# Patient Record
Sex: FEMALE | Employment: FULL TIME | URBAN - METROPOLITAN AREA
[De-identification: names, ages, dates, MRNs, and addresses within clinical notes are randomized per-mention and may not be internally consistent; named-entity substitution may affect disease eponyms.]

---

## 2017-06-25 ENCOUNTER — HOSPITAL ENCOUNTER (EMERGENCY)
Age: 27
Discharge: HOME | End: 2017-06-25
Attending: EMERGENCY MEDICINE
Payer: MEDICAID

## 2017-06-25 VITALS
HEART RATE: 104 BPM | HEIGHT: 61 IN | SYSTOLIC BLOOD PRESSURE: 170 MMHG | DIASTOLIC BLOOD PRESSURE: 114 MMHG | WEIGHT: 220 LBS | RESPIRATION RATE: 18 BRPM | TEMPERATURE: 98.6 F | BODY MASS INDEX: 41.54 KG/M2 | OXYGEN SATURATION: 97 %

## 2017-06-25 DIAGNOSIS — K02.9 DENTAL DECAY: Primary | ICD-10-CM

## 2017-06-25 DIAGNOSIS — K08.89 DENTALGIA: ICD-10-CM

## 2017-06-25 DIAGNOSIS — I10 ESSENTIAL HYPERTENSION: ICD-10-CM

## 2017-06-25 DIAGNOSIS — K04.7 DENTAL INFECTION: ICD-10-CM

## 2017-06-25 PROCEDURE — 74011250636 HC RX REV CODE- 250/636: Performed by: PHYSICIAN ASSISTANT

## 2017-06-25 PROCEDURE — 96372 THER/PROPH/DIAG INJ SC/IM: CPT

## 2017-06-25 PROCEDURE — 99282 EMERGENCY DEPT VISIT SF MDM: CPT

## 2017-06-25 PROCEDURE — 74011000250 HC RX REV CODE- 250: Performed by: PHYSICIAN ASSISTANT

## 2017-06-25 RX ORDER — ACETAMINOPHEN AND CODEINE PHOSPHATE 300; 30 MG/1; MG/1
1 TABLET ORAL
Qty: 12 TAB | Refills: 0 | Status: SHIPPED | OUTPATIENT
Start: 2017-06-25 | End: 2017-08-20

## 2017-06-25 RX ORDER — CLINDAMYCIN PHOSPHATE 150 MG/ML
300 INJECTION, SOLUTION INTRAVENOUS
Status: COMPLETED | OUTPATIENT
Start: 2017-06-25 | End: 2017-06-25

## 2017-06-25 RX ORDER — CLINDAMYCIN HYDROCHLORIDE 300 MG/1
300 CAPSULE ORAL 4 TIMES DAILY
Qty: 28 CAP | Refills: 0 | Status: SHIPPED | OUTPATIENT
Start: 2017-06-25 | End: 2017-07-02

## 2017-06-25 RX ORDER — CHLORHEXIDINE GLUCONATE 1.2 MG/ML
15 RINSE ORAL 2 TIMES DAILY
Qty: 420 ML | Refills: 0 | Status: SHIPPED | OUTPATIENT
Start: 2017-06-25 | End: 2017-07-09

## 2017-06-25 RX ORDER — KETOROLAC TROMETHAMINE 30 MG/ML
60 INJECTION, SOLUTION INTRAMUSCULAR; INTRAVENOUS
Status: COMPLETED | OUTPATIENT
Start: 2017-06-25 | End: 2017-06-25

## 2017-06-25 RX ADMIN — KETOROLAC TROMETHAMINE 60 MG: 30 INJECTION, SOLUTION INTRAMUSCULAR at 16:59

## 2017-06-25 RX ADMIN — CLINDAMYCIN PHOSPHATE 300 MG: 150 INJECTION, SOLUTION INTRAMUSCULAR; INTRAVENOUS at 16:59

## 2017-06-25 NOTE — ED NOTES
Patient armband removed and shredded  I have reviewed discharge instructions with the patient. The patient verbalized understanding. Patient discharged on three prescritpions.

## 2017-06-25 NOTE — ED PROVIDER NOTES
HPI Comments: 4:10 PM Kushal Tian is a 28 y.o. female who presents to the ED c/o R upper dental pain. Pt states that the pain started 1 day ago. Pt has assocaited R sided facial swelling. Pt has taken Ibuprofen w/ mild relief. Pt admits to not taking her HTN medication today. Pt denies drainage in the mouth, CP, SOB, HA. Pt has no other sx or complaints. Patient is a 28 y.o. female presenting with dental problem. The history is provided by the patient. Dental Pain    This is a new problem. The current episode started yesterday. The problem occurs constantly. The problem has been gradually worsening. The pain is located in the front and right upper mouth. The pain is at a severity of 10/10. Associated symptoms include swelling and gum redness. There was no vomiting, no nausea, no fever, no chest pain, no shortness of breath, no headaches and no drainage. Treatments tried: NSAIDs. The treatment provided mild relief. The patient has no cardiac history. Past Medical History:   Diagnosis Date    Dental caries     Gastrointestinal disorder     Hernia removed    Hypertension     Ill-defined condition        Past Surgical History:   Procedure Laterality Date    HX GYN      4 sections    HX OTHER SURGICAL      tonsils and addenoids removed         No family history on file. Social History     Social History    Marital status:      Spouse name: N/A    Number of children: N/A    Years of education: N/A     Occupational History    Not on file. Social History Main Topics    Smoking status: Current Every Day Smoker     Packs/day: 0.50    Smokeless tobacco: Not on file    Alcohol use No    Drug use: No    Sexual activity: Not on file     Other Topics Concern    Not on file     Social History Narrative         ALLERGIES: Penicillins    Review of Systems   Constitutional: Negative for chills and fever. HENT: Positive for dental problem and facial swelling.  Negative for congestion, ear pain, hearing loss, rhinorrhea, sore throat, trouble swallowing and voice change. Eyes: Negative for pain and redness. Respiratory: Negative for cough and shortness of breath. Cardiovascular: Negative for chest pain, palpitations and leg swelling. Gastrointestinal: Negative for abdominal pain, constipation, diarrhea, nausea and vomiting. Genitourinary: Negative for dysuria. Musculoskeletal: Negative for neck pain and neck stiffness. Skin: Negative. Neurological: Negative for dizziness, light-headedness and headaches. Psychiatric/Behavioral: Negative. Vitals:    06/25/17 1541   BP: (!) 170/114   Pulse: (!) 104   Resp: 18   Temp: 98.6 °F (37 °C)   SpO2: 97%   Weight: 99.8 kg (220 lb)   Height: 5' 1\" (1.549 m)            Physical Exam   Constitutional: She is oriented to person, place, and time. She appears well-developed and well-nourished. HENT:   Head: Normocephalic and atraumatic. Right Ear: Tympanic membrane, external ear and ear canal normal.   Left Ear: Tympanic membrane, external ear and ear canal normal.   Nose: Nose normal.   Mouth/Throat: Uvula is midline, oropharynx is clear and moist and mucous membranes are normal. Abnormal dentition. Dental caries present. No dental abscesses or uvula swelling. No oropharyngeal exudate, posterior oropharyngeal edema, posterior oropharyngeal erythema or tonsillar abscesses. Dental pain at # 2-9. There IS evidence of dental decay. There IS tenderness on palpation. The airway IS patent. Mild upper right mucobuccal soft tissue swelling, but no fluctuance or gingival bleeding. NO pus/drainage. NO swelling or elevation of the tongue. NO sublingual swelling or TTP. NO facial swelling. NO neck swelling. Eyes: Conjunctivae and EOM are normal. Pupils are equal, round, and reactive to light. Neck: Normal range of motion and full passive range of motion without pain. Neck supple.  No spinous process tenderness and no muscular tenderness present. No rigidity. No tracheal deviation, no edema, no erythema and normal range of motion present. No neck swelling or induration. Cardiovascular: Normal rate, regular rhythm, normal heart sounds and intact distal pulses. Exam reveals no gallop and no friction rub. No murmur heard. Pulmonary/Chest: Effort normal and breath sounds normal. No respiratory distress. She has no wheezes. Abdominal: Soft. Bowel sounds are normal. There is no tenderness. Musculoskeletal: Normal range of motion. Lymphadenopathy:     She has no cervical adenopathy. Neurological: She is alert and oriented to person, place, and time. Skin: Skin is warm and dry. Psychiatric: She has a normal mood and affect. Her behavior is normal. Judgment and thought content normal.   Nursing note and vitals reviewed. MDM  Number of Diagnoses or Management Options  Dental decay: new and requires workup  Dental infection: new and requires workup  Dentalgia: new and requires workup  Essential hypertension: new and requires workup  Diagnosis management comments: DDx: Odontalgia, Dental caries,  Periodontal disease, Periapical abscess, Trigeminal neuralgia,  space infection, Neal's angina, Retropharyngeal space infection, Infection after root canal, Dry Socket, Acute Necrotizing Ulcerative Gingivitis (ANUG). IMPRESSION AND MEDICAL DECISION MAKING:  Based upon the patients presentation with noted HPI and PE, along with the work up done in the emergency department, I believe that the patient is having a toothache and gum swelling to dental decay, possible infection, but do not visualize facial swelling. No evidence of abscess at this time. No airway compromise. Patient given non-narcotic pain control medications in the ED, and dose of IM antibiotics. Will discharge to home with antibiotic coverage and symptomatic treatment. The patient has had a high blood pressure reading in the emergency department.  He or she will be referred to their PCP or to a local clinic for management of their elevated blood pressure reading. If they currently take hypertensive medication they will be encouraged to take their medications. The diagnosis of hypertension requires multiple readings of elevation over a longer period of time than the brief period spent in the emergency department. Discussed care, f/u and s/s warranting return to ED. Pt understood and agreed to plan. Amber Morley PA-C     DIAGNOSIS:  [address] [facility]  Dental infection. SPECIFIC PATIENT INSTRUCTIONS FROM THE PROVIDER WHO TREATED YOU IN THE ER TODAY  Finish antibiotics as directed. Rinse your mouth with mouth wash after each meal or more often. Rinse mouth with Peridex as prescribed, once in the morning and once in the evening after brushing your teeth. Take Motrin for pain as needed. Take Tylenol 3 for pain not controlled with motrin. Return for any concerns, changes in condition, or as needed. Follow-up in 7-10 days with your dentist or one of the provided dental clinics below:  [facility] [address] [phone]  [address] [place] [address] [phone]  [facility] [phone]  [address] [place] [address] [phone]   [facility] [phone]   [address] .. [phone]   *Cleaning only  [address] .. [phone]  *Preventive care only  *No insurance required-cash/check only    Call to schedule an appointment. Pt results have been reviewed with them. They have been counseled regarding diagnosis, treatment, and plan. Pt verbally conveys understanding and agreement of the signs, symptoms, diagnosis, treatment and prognosis and additionally agrees to follow up as discussed. Pt also agrees with the care-plan and conveys that all of their questions have been answered.  I have also provided discharge instructions for them that include: educational information regarding their diagnosis and treatment, and list of reasons why they would want to return to the ED prior to their follow-up appointment, should their condition change. Debbie Khan PA-C          Amount and/or Complexity of Data Reviewed  Review and summarize past medical records: yes  Discuss the patient with other providers: yes    Risk of Complications, Morbidity, and/or Mortality  Presenting problems: low  Diagnostic procedures: low  Management options: low    Patient Progress  Patient progress: stable    ED Course       Procedures      Vitals:  Patient Vitals for the past 12 hrs:   Temp Pulse Resp BP SpO2   06/25/17 1541 98.6 °F (37 °C) (!) 104 18 (!) 170/114 97 %       Medications ordered:   Medications   clindamycin phosphate (CLEOCIN) 150 mg/mL injection 300 mg (not administered)   ketorolac tromethamine (TORADOL) 60 mg/2 mL injection 60 mg (not administered)         Lab findings:  No results found for this or any previous visit (from the past 12 hour(s)). EKG interpretation by ED Physician:    X-Ray, CT or other radiology findings or impressions:  No orders to display       Progress notes, Consult notes or additional Procedure notes: Improved      Disposition: Discharge to home. Diagnosis:   1. Dental decay    2. Dentalgia    3. Dental infection    4. Essential hypertension        Disposition:    Follow-up Information     Follow up With Details Comments Contact Info    [facility] EMERGENCY DEPT  As needed, If symptoms worsen [address] [facility] in 2 days  [address] [employer]   [address]) [address] [phone]    [facility] Go in 2 days  [address] Cristal Peters [phone] [facility] In 1 week  [address] [facility] [address]           Patient's Medications   Start Taking    ACETAMINOPHEN-CODEINE (TYLENOL-CODEINE #3) 300-30 MG PER TABLET    Take 1 Tab by mouth every four (4) hours as needed for Pain. Max Daily Amount: 6 Tabs. CHLORHEXIDINE (PERIDEX) 0.12 % SOLUTION    15 mL by Swish and Spit route two (2) times a day for 14 days. CLINDAMYCIN (CLEOCIN) 300 MG CAPSULE    Take 1 Cap by mouth four (4) times daily for 7 days. Continue Taking    CARVEDILOL (COREG) 25 MG TABLET    Take 25 mg by mouth two (2) times daily (with meals). INDAPAMIDE (LOZOL) 2.5 MG TABLET    Take 2.5 mg by mouth daily. ONDANSETRON (ZOFRAN ODT) 4 MG DISINTEGRATING TABLET    Take 1 Tab by mouth every eight (8) hours as needed for Nausea. SPIRONOLACTONE (ALDACTONE) 25 MG TABLET    Take 25 mg by mouth daily. These Medications have changed    No medications on file   Stop Taking    NAPROXEN (NAPROSYN) 375 MG TABLET    Take 1 Tab by mouth two (2) times daily (with meals). Scribe Attestation:   I, Ian Barnes, am scribing for and in the presence of Cal Maldonado on this day 06/25/17 at 4:09 PM   kurt Gatica    Provider Attestation:  I personally performed the services described in the documentation, reviewed the documentation, as recorded by the scribe in my presence, and it accurately and completely records my words and actions.   Manish Hill PA-C. 4:09 PM      Signed by: Kurt Gatica, 4:09 PM

## 2017-08-20 ENCOUNTER — HOSPITAL ENCOUNTER (EMERGENCY)
Age: 27
Discharge: HOME | End: 2017-08-20
Attending: EMERGENCY MEDICINE
Payer: MEDICAID

## 2017-08-20 VITALS
TEMPERATURE: 98.1 F | OXYGEN SATURATION: 97 % | RESPIRATION RATE: 16 BRPM | SYSTOLIC BLOOD PRESSURE: 167 MMHG | HEART RATE: 88 BPM | DIASTOLIC BLOOD PRESSURE: 112 MMHG

## 2017-08-20 DIAGNOSIS — K02.9 PAIN DUE TO DENTAL CARIES: Primary | ICD-10-CM

## 2017-08-20 DIAGNOSIS — I15.9 SECONDARY HYPERTENSION: ICD-10-CM

## 2017-08-20 PROCEDURE — 96372 THER/PROPH/DIAG INJ SC/IM: CPT

## 2017-08-20 PROCEDURE — 99282 EMERGENCY DEPT VISIT SF MDM: CPT

## 2017-08-20 PROCEDURE — 74011250636 HC RX REV CODE- 250/636: Performed by: NURSE PRACTITIONER

## 2017-08-20 RX ORDER — CHLORHEXIDINE GLUCONATE 1.2 MG/ML
15 RINSE ORAL 2 TIMES DAILY
Qty: 420 ML | Refills: 0 | Status: SHIPPED | OUTPATIENT
Start: 2017-08-20 | End: 2017-09-03

## 2017-08-20 RX ORDER — DOXYCYCLINE 100 MG/1
100 CAPSULE ORAL 2 TIMES DAILY
Qty: 14 CAP | Refills: 0 | Status: SHIPPED | OUTPATIENT
Start: 2017-08-20 | End: 2017-08-27

## 2017-08-20 RX ORDER — ACETAMINOPHEN AND CODEINE PHOSPHATE 300; 30 MG/1; MG/1
1 TABLET ORAL
Qty: 12 TAB | Refills: 0 | Status: SHIPPED | OUTPATIENT
Start: 2017-08-20 | End: 2017-11-13 | Stop reason: ALTCHOICE

## 2017-08-20 RX ORDER — SPIRONOLACTONE 25 MG/1
25 TABLET ORAL DAILY
Qty: 30 TAB | Refills: 0 | Status: SHIPPED | OUTPATIENT
Start: 2017-08-20 | End: 2021-07-21

## 2017-08-20 RX ORDER — KETOROLAC TROMETHAMINE 30 MG/ML
30 INJECTION, SOLUTION INTRAMUSCULAR; INTRAVENOUS
Status: COMPLETED | OUTPATIENT
Start: 2017-08-20 | End: 2017-08-20

## 2017-08-20 RX ORDER — CARVEDILOL 25 MG/1
25 TABLET ORAL 2 TIMES DAILY WITH MEALS
Qty: 60 TAB | Refills: 0 | Status: SHIPPED | OUTPATIENT
Start: 2017-08-20

## 2017-08-20 RX ORDER — INDAPAMIDE 2.5 MG/1
2.5 TABLET, FILM COATED ORAL DAILY
Qty: 30 TAB | Refills: 0 | Status: SHIPPED | OUTPATIENT
Start: 2017-08-20

## 2017-08-20 RX ADMIN — KETOROLAC TROMETHAMINE 30 MG: 30 INJECTION, SOLUTION INTRAMUSCULAR at 17:52

## 2017-08-20 NOTE — ED PROVIDER NOTES
HPI Comments: Patient is a 28 y.o.  female with the following medical history:   Past Medical History:  No date: Dental caries  No date: Gastrointestinal disorder      Comment: Hernia removed  No date: Hypertension    Presents to the ED with Dental Pain from a molar in the right upper area of her mouth that broke when she closed her mouth strongly and denies biting on anything or chewing on anything this morning. She has tried oragel and she still has severe throbbing pain. She states that she took all her BP medications even though she has not seen her previous PCP at the [facility] since May and got her BP medications filled at [facility] a month ago. She denies insurance and her spouse will not be eligible till November so she is unsure when she will be able to get her dental care worked on. She denies swelling to the gum, face, inability to control oral secretions, swallow, sensation of swelling to tongue or throat, discharge of blood or purulence, inability to open or close mouth, external trauma or abuse, fever/chills, n/v, CP, SOB or swallowing/inhaling the broken tooth. Patient is a 28 y.o. female presenting with dental problem. The history is provided by the patient. Dental Pain    This is a recurrent problem. The current episode started 3 to 5 hours ago. The problem occurs constantly. The problem has not changed since onset. The pain is located in the right upper mouth. The quality of the pain is dull and throbbing. The pain is at a severity of 9/10. The pain is severe. Associated symptoms include gum redness. There was no vomiting, no nausea, no fever, no swelling, no chest pain, no shortness of breath, no headaches and no drainage. She has tried nothing for the symptoms. The treatment provided no relief. The patient has no cardiac history.        Past Medical History:   Diagnosis Date    Dental caries     Gastrointestinal disorder     Hernia removed    Hypertension     Ill-defined condition        Past Surgical History:   Procedure Laterality Date    HX GYN      4 sections    HX OTHER SURGICAL      tonsils and addenoids removed         History reviewed. No pertinent family history. Social History     Social History    Marital status:      Spouse name: N/A    Number of children: N/A    Years of education: N/A     Occupational History    Not on file. Social History Main Topics    Smoking status: Current Every Day Smoker     Packs/day: 0.50    Smokeless tobacco: Never Used    Alcohol use No    Drug use: No    Sexual activity: Not on file     Other Topics Concern    Not on file     Social History Narrative         ALLERGIES: Penicillins    Review of Systems   Constitutional: Negative for activity change, appetite change, chills and fever. HENT: Positive for dental problem. Negative for congestion, drooling, ear discharge, ear pain, facial swelling, hearing loss, mouth sores, nosebleeds, trouble swallowing and voice change. Eyes: Negative for discharge, redness and visual disturbance. Respiratory: Negative for cough, chest tightness and shortness of breath. Cardiovascular: Negative for chest pain and leg swelling. Gastrointestinal: Negative for abdominal pain, constipation, diarrhea, nausea and vomiting. Genitourinary: Negative for difficulty urinating, dysuria, flank pain, frequency and urgency. Musculoskeletal: Negative for back pain, joint swelling, neck pain and neck stiffness. Skin: Negative for color change, rash and wound. Neurological: Negative for dizziness, speech difficulty and headaches. Psychiatric/Behavioral: Negative for agitation and behavioral problems. The patient is not nervous/anxious. Vitals:    08/20/17 1703   BP: (!) 167/112   Pulse: 88   Resp: 16   Temp: 98.1 °F (36.7 °C)   SpO2: 97%            Physical Exam   Constitutional: She is oriented to person, place, and time.  She appears well-developed and well-nourished. She appears distressed (holding her face with her hand and closing her eyes). HENT:   Head: Normocephalic. Right Ear: External ear normal.   Left Ear: External ear normal.   Nose: Nose normal.   Mouth/Throat: Uvula is midline, oropharynx is clear and moist and mucous membranes are normal. She does not have dentures. No oral lesions. No trismus in the jaw. Abnormal dentition. Dental caries present. No dental abscesses, uvula swelling or lacerations. No oropharyngeal exudate. Eyes: Conjunctivae and EOM are normal. No scleral icterus. Neck: Normal range of motion. Neck supple. No JVD present. No tracheal deviation present. No thyromegaly present. Cardiovascular: Normal rate, regular rhythm and normal heart sounds. Pulmonary/Chest: Effort normal and breath sounds normal. No stridor. No respiratory distress. Musculoskeletal: She exhibits no edema. Lymphadenopathy:     She has no cervical adenopathy. Neurological: She is alert and oriented to person, place, and time. Skin: Skin is warm and dry. Psychiatric: She has a normal mood and affect. Her behavior is normal. Judgment and thought content normal.        MDM  Number of Diagnoses or Management Options  Pain due to dental caries:   Secondary hypertension:   Diagnosis management comments: The primary encounter diagnosis was Pain due to dental caries. A diagnosis of Secondary hypertension was also pertinent to this visit.       ED Course       Procedures

## 2017-08-22 ENCOUNTER — HOSPITAL ENCOUNTER (EMERGENCY)
Age: 27
Discharge: HOME | End: 2017-08-22
Attending: EMERGENCY MEDICINE
Payer: MEDICAID

## 2017-08-22 VITALS
RESPIRATION RATE: 16 BRPM | BODY MASS INDEX: 43.99 KG/M2 | SYSTOLIC BLOOD PRESSURE: 177 MMHG | HEART RATE: 81 BPM | DIASTOLIC BLOOD PRESSURE: 113 MMHG | HEIGHT: 61 IN | TEMPERATURE: 98.8 F | WEIGHT: 233 LBS | OXYGEN SATURATION: 97 %

## 2017-08-22 DIAGNOSIS — K04.7 DENTAL ABSCESS: Primary | ICD-10-CM

## 2017-08-22 DIAGNOSIS — K02.9 INFECTED DENTAL CARRIES: ICD-10-CM

## 2017-08-22 DIAGNOSIS — R03.0 ELEVATED BLOOD PRESSURE READING: ICD-10-CM

## 2017-08-22 DIAGNOSIS — K04.7 INFECTED DENTAL CARRIES: ICD-10-CM

## 2017-08-22 PROCEDURE — 99283 EMERGENCY DEPT VISIT LOW MDM: CPT

## 2017-08-22 PROCEDURE — 74011250637 HC RX REV CODE- 250/637: Performed by: EMERGENCY MEDICINE

## 2017-08-22 RX ORDER — HYDROCODONE BITARTRATE AND ACETAMINOPHEN 5; 325 MG/1; MG/1
TABLET ORAL
Qty: 6 TAB | Refills: 0 | Status: SHIPPED | OUTPATIENT
Start: 2017-08-22 | End: 2017-11-13 | Stop reason: ALTCHOICE

## 2017-08-22 RX ORDER — HYDROCODONE BITARTRATE AND ACETAMINOPHEN 5; 325 MG/1; MG/1
1 TABLET ORAL
Status: COMPLETED | OUTPATIENT
Start: 2017-08-22 | End: 2017-08-22

## 2017-08-22 RX ORDER — CLINDAMYCIN HYDROCHLORIDE 300 MG/1
300 CAPSULE ORAL 3 TIMES DAILY
Qty: 21 CAP | Refills: 0 | Status: SHIPPED | OUTPATIENT
Start: 2017-08-22 | End: 2017-08-29

## 2017-08-22 RX ORDER — CLINDAMYCIN HYDROCHLORIDE 150 MG/1
300 CAPSULE ORAL
Status: COMPLETED | OUTPATIENT
Start: 2017-08-22 | End: 2017-08-22

## 2017-08-22 RX ADMIN — CLINDAMYCIN HYDROCHLORIDE 300 MG: 150 CAPSULE ORAL at 07:14

## 2017-08-22 RX ADMIN — HYDROCODONE BITARTRATE AND ACETAMINOPHEN 1 TABLET: 5; 325 TABLET ORAL at 07:14

## 2017-08-22 NOTE — ED PROVIDER NOTES
HPI Comments: 7:02 AM Marzena Aguillon is a 28 y.o. female with a history of HTN presents to ED c/o dental pain worsened today. The Pain is located on the top right side of her mouth. Pt states that she was seen at [facility] 2 days ago and was prescribed an antibiotic however the pain remained. She was also given Ibuprofen and Tylenol with codeine. Pt states that the current tooth pain is different from the prior toothache. Pt states that she has not been able to see a dentist secondary to not having insurance. Pt is unable to eat anything secondary to the pain. Pt is on medication for her HTN. Pt has persistent tobacco abuse. Denies fever, n/v, and any other symptoms at the moment. The history is provided by the patient. Past Medical History:   Diagnosis Date    Dental caries     Gastrointestinal disorder     Hernia removed    Hypertension     Ill-defined condition        Past Surgical History:   Procedure Laterality Date    HX GYN      4 sections    HX OTHER SURGICAL      tonsils and addenoids removed         No family history on file. Social History     Social History    Marital status:      Spouse name: N/A    Number of children: N/A    Years of education: N/A     Occupational History    Not on file. Social History Main Topics    Smoking status: Current Every Day Smoker     Packs/day: 0.50    Smokeless tobacco: Never Used    Alcohol use No    Drug use: No    Sexual activity: Not on file     Other Topics Concern    Not on file     Social History Narrative         ALLERGIES: Penicillins    Review of Systems   Constitutional: Positive for appetite change. Negative for fever. HENT: Positive for dental problem. Gastrointestinal: Negative for nausea and vomiting. All other systems reviewed and are negative.       Vitals:    08/22/17 0650   BP: (!) 177/113   Pulse: 81   Resp: 16   Temp: 98.8 °F (37.1 °C)   SpO2: 97%   Weight: 105.7 kg (233 lb)   Height: 5' 1\" (1.549 m) Physical Exam   Constitutional: She is oriented to person, place, and time. She appears well-developed and well-nourished. HENT:   Head: Normocephalic and atraumatic. Mouth/Throat:       Uvula midline  Mild right upper mandibular swelling noted, no erythema  Multiple cracked teeth with carries, multiple dentition missing  No floor of mouth crepitus   Neck: Neck supple. No JVD present. Musculoskeletal: She exhibits no edema. Lymphadenopathy:     She has no cervical adenopathy. Neurological: She is alert and oriented to person, place, and time. Skin: Skin is warm and dry. No erythema. MDM  Number of Diagnoses or Management Options  Dental abscess:   Elevated blood pressure reading:   Infected dental carries:   Diagnosis management comments: : script for T#3, 12 tabs from 6/25    29 y/o female presents with dental pain and swelling. Reports taking doxy x3, will change to clinda  Give short course of pain meds    No signs of serious infection, ludwigs. Pt tolerating secretions, afebrile. Discussed importance of dental follow up    Pt noted to have elevated BP. Pt is asymptomatic and was referred to PCP for follow up. ED Course       Procedures           Vitals:  Patient Vitals for the past 12 hrs:   Temp Pulse Resp BP SpO2   08/22/17 0650 98.8 °F (37.1 °C) 81 16 (!) 177/113 97 %   Patient is 97% O2 on RA, indicating adequate oxygenation. Medications ordered:   Medications   HYDROcodone-acetaminophen (NORCO) 5-325 mg per tablet 1 Tab (not administered)   clindamycin (CLEOCIN) capsule 300 mg (not administered)           Diagnosis:   1. Dental abscess    2. Infected dental carries    3. Elevated blood pressure reading        Disposition: discharged. Follow-up Information     None           Patient's Medications   Start Taking    CLINDAMYCIN (CLEOCIN) 300 MG CAPSULE    Take 1 Cap by mouth three (3) times daily for 7 days.     HYDROCODONE-ACETAMINOPHEN (NORCO) 5-325 MG PER TABLET Take 1-2 tablets PO every 4-6 hours as needed for pain control. If over the counter ibuprofen or acetaminophen was suggested, then only take the vicodin for pain not well controlled with the over the counter medication. Continue Taking    ACETAMINOPHEN-CODEINE (TYLENOL-CODEINE #3) 300-30 MG PER TABLET    Take 1 Tab by mouth every four (4) hours as needed for Pain. Max Daily Amount: 6 Tabs. CARVEDILOL (COREG) 25 MG TABLET    Take 1 Tab by mouth two (2) times daily (with meals). Indications: hypertension    CHLORHEXIDINE (PERIDEX) 0.12 % SOLUTION    15 mL by Swish and Spit route two (2) times a day for 14 days. Indications: MOUTH INFECTION PREVENTION    DOXYCYCLINE (MONODOX) 100 MG CAPSULE    Take 1 Cap by mouth two (2) times a day for 7 days. INDAPAMIDE (LOZOL) 2.5 MG TABLET    Take 1 Tab by mouth daily. Indications: hypertension    SPIRONOLACTONE (ALDACTONE) 25 MG TABLET    Take 1 Tab by mouth daily. Indications: hypertension   These Medications have changed    No medications on file   Stop Taking    No medications on file         Scribe Attestation      Kathya Branham (Aj) acting as a scribe for and in the presence of Santos Graves DO      August 22, 2017 at [facility]       Provider Attestation:      I personally performed the services described in the documentation, reviewed the documentation, as recorded by the scribe in my presence, and it accurately and completely records my words and actions.  August 22, 2017 at [facility] Mojica, DO

## 2017-08-22 NOTE — DISCHARGE INSTRUCTIONS
Tooth Decay: Care Instructions  Your Care Instructions    Tooth decay is damage to a tooth caused by plaque. Plaque is a thin film of bacteria that sticks to the teeth above and below the gum line. If plaque isn't removed from the teeth, it can build up and harden into tartar. The bacteria in plaque and tartar use sugars in food to make acids. These acids can cause tooth decay and gum disease. Any part of your tooth can decay, from the roots below the gum line to the chewing surface. Decay can affect the outer layer (enamel) or inner layer (dentin) of your teeth. The deeper the decay, the worse the damage. Untreated tooth decay will get worse and may lead to tooth loss. If you have a small hole (cavity) in your tooth, your dentist can repair it by removing the decay and filling the hole. If you have deeper decay, you may need more treatment. A very badly damaged tooth may have to be removed. Follow-up care is a key part of your treatment and safety. Be sure to make and go to all appointments, and call your dentist if you are having problems. It's also a good idea to know your test results and keep a list of the medicines you take. How can you care for yourself at home? If you have pain:  · Take an over-the-counter pain medicine, such as acetaminophen (Tylenol), ibuprofen (Advil, Motrin), or naproxen (Aleve). Be safe with medicines. Read and follow all instructions on the label. ¨ Do not take two or more pain medicines at the same time unless the doctor told you to. Many pain medicines have acetaminophen, which is Tylenol. Too much acetaminophen (Tylenol) can be harmful. · Put ice or a cold pack on your cheek over the tooth for 10 to 15 minutes at a time. Put a thin cloth between the ice and your skin. To prevent tooth decay  · Brush teeth twice a day, and floss once a day. Brushing with fluoride toothpaste and flossing may be enough to reverse early decay.   · Use a toothbrush with soft, rounded-end bristles and a head that is small enough to reach all parts of your teeth and mouth. Replace your toothbrush every 3 or 4 months. You may also use an electric toothbrush that has rotating and oscillating (back-and-forth) action. · Ask your dentist about having fluoride treatments at the dental office. · Brush your tongue to help get rid of bacteria. · Eat healthy foods that include whole grains, vegetables, and fruits. · Have your teeth cleaned by a professional at least two times a year. · Do not smoke or use smokeless tobacco. Tobacco can make tooth decay worse. When should you call for help? Call 911 anytime you think you may need emergency care. For example, call if:  · You have trouble breathing. Call your dentist now or seek immediate medical care if:  · You have new or worse symptoms of infection, such as:  ¨ Increased pain, swelling, warmth, or redness. ¨ Red streaks leading from the area. ¨ Pus draining from the area. ¨ A fever. Watch closely for changes in your health, and be sure to contact your doctor if:  · You do not get better as expected. Where can you learn more? Go to http://ivan-tenzin.info/. Enter M440 in the search box to learn more about \"Tooth Decay: Care Instructions. \"  Current as of: August 11, 2016  Content Version: 11.3  © 6787-1448 [employer]. Care instructions adapted under license by [employer] (which disclaims liability or warranty for this information). If you have questions about a medical condition or this instruction, always ask your healthcare professional. [place] [address] any warranty or liability for your use of this information. Learning About Dental Sealants  What is a dental sealant? A dental sealant is a strong liquid-plastic material that helps protect teeth from plaque. Plaque is a thin film of bacteria that sticks to teeth. The bacteria in plaque use sugars in food to make acids. These acids can damage the tooth's surface and cause tooth decay. The sealant is put on the chewing surfaces of the back teeth (molars). These teeth are more likely to develop tooth decay because food and bacteria easily get stuck in the pits and grooves of the surface. Some pits and grooves are so small that a toothbrush can't clean them out. Sealants bond to the tooth's enamel. Enamel is the hard surface of the tooth. It covers the dentin, which protects and surrounds the tooth pulp. The pulp is the core of the tooth, the place where nerves and blood vessels are. A dental sealant does not take the place of good dental care and use of fluoride toothpaste and mouthwash. It's still important to brush and floss daily. Sealants can be used in children, starting at about age 10, and in teens and adults. How is it applied? To apply a sealant, the dentist will place a cotton roll around the teeth to soak up saliva and keep the teeth dry. A sheet of rubber called a rubber dam may be used to isolate the teeth from the rest of the mouth. After cleaning the teeth, the dentist applies the liquid that will glue the sealant to the teeth. After about a minute, he or she will rinse off the excess glue, dry the teeth, and put on the sealant. One type of sealant hardens quickly with the use of a bright light. Others harden more slowly. Sealants can also be applied by a dental assistant or dental hygienist.  How long does a dental sealant last?  Dental sealants may wear down over time, but they can protect teeth from decay for years. Your dentist can check them and reapply them if needed. Follow-up care is a key part of your treatment and safety. Be sure to make and go to all appointments, and call your dentist if you are having problems. It's also a good idea to know your test results and keep a list of the medicines you take. Where can you learn more? Go to http://ivan-tenzin.info/.   Enter C103 in the search box to learn more about \"Learning About Dental Sealants. \"  Current as of: August 9, 2016  Content Version: 11.3  © 3309-8530 [employer]. Care instructions adapted under license by [employer] (which disclaims liability or warranty for this information). If you have questions about a medical condition or this instruction, always ask your healthcare professional. [place] [address] any warranty or liability for your use of this information.

## 2017-08-22 NOTE — ED NOTES
I have reviewed discharge instructions with the patient. The patient verbalized understanding. Pt states  coming to drive pt home.

## 2017-08-22 NOTE — ED TRIAGE NOTES
Patient presents to ER C/O Facial Swelling to right face and dental pain since Sunday (2 days ago) States she was seen at [facility] ED on Sunday, states she was given an antibiotic and states she has been taking it twice a day.

## 2017-11-13 ENCOUNTER — HOSPITAL ENCOUNTER (EMERGENCY)
Age: 27
Discharge: HOME | End: 2017-11-13
Attending: EMERGENCY MEDICINE
Payer: MEDICAID

## 2017-11-13 VITALS
DIASTOLIC BLOOD PRESSURE: 116 MMHG | OXYGEN SATURATION: 97 % | RESPIRATION RATE: 15 BRPM | TEMPERATURE: 98.9 F | SYSTOLIC BLOOD PRESSURE: 174 MMHG | WEIGHT: 235 LBS | HEART RATE: 90 BPM | BODY MASS INDEX: 44.4 KG/M2

## 2017-11-13 DIAGNOSIS — J02.0 ACUTE STREPTOCOCCAL PHARYNGITIS: Primary | ICD-10-CM

## 2017-11-13 DIAGNOSIS — I10 ESSENTIAL HYPERTENSION: ICD-10-CM

## 2017-11-13 PROCEDURE — 74011250637 HC RX REV CODE- 250/637: Performed by: PHYSICIAN ASSISTANT

## 2017-11-13 PROCEDURE — 99283 EMERGENCY DEPT VISIT LOW MDM: CPT

## 2017-11-13 PROCEDURE — 87081 CULTURE SCREEN ONLY: CPT | Performed by: PHYSICIAN ASSISTANT

## 2017-11-13 RX ORDER — DEXAMETHASONE SODIUM PHOSPHATE 4 MG/ML
10 INJECTION, SOLUTION INTRA-ARTICULAR; INTRALESIONAL; INTRAMUSCULAR; INTRAVENOUS; SOFT TISSUE ONCE
Status: COMPLETED | OUTPATIENT
Start: 2017-11-13 | End: 2017-11-13

## 2017-11-13 RX ORDER — CEPHALEXIN 500 MG/1
500 CAPSULE ORAL 2 TIMES DAILY
Qty: 20 CAP | Refills: 0 | Status: SHIPPED | OUTPATIENT
Start: 2017-11-13 | End: 2017-11-23

## 2017-11-13 RX ORDER — CARVEDILOL 25 MG/1
25 TABLET ORAL
Status: COMPLETED | OUTPATIENT
Start: 2017-11-13 | End: 2017-11-13

## 2017-11-13 RX ORDER — ACETAMINOPHEN AND CODEINE PHOSPHATE 120; 12 MG/5ML; MG/5ML
SOLUTION ORAL
Qty: 120 ML | Refills: 0 | Status: SHIPPED | OUTPATIENT
Start: 2017-11-13 | End: 2018-07-08

## 2017-11-13 RX ADMIN — DEXAMETHASONE SODIUM PHOSPHATE 10 MG: 4 INJECTION, SOLUTION INTRAMUSCULAR; INTRAVENOUS at 09:45

## 2017-11-13 RX ADMIN — CARVEDILOL 25 MG: 25 TABLET, FILM COATED ORAL at 09:46

## 2017-11-13 NOTE — DISCHARGE INSTRUCTIONS
Strep Throat: Care Instructions  Your Care Instructions    Strep throat is a bacterial infection that causes sudden, severe sore throat and fever. Strep throat, which is caused by bacteria called streptococcus, is treated with antibiotics. Sometimes a strep test is necessary to tell if the sore throat is caused by strep bacteria. Treatment can help ease symptoms and may prevent future problems. Follow-up care is a key part of your treatment and safety. Be sure to make and go to all appointments, and call your doctor if you are having problems. It's also a good idea to know your test results and keep a list of the medicines you take. How can you care for yourself at home? · Take your antibiotics as directed. Do not stop taking them just because you feel better. You need to take the full course of antibiotics. · Strep throat can spread to others until 24 hours after you begin taking antibiotics. During this time, you should avoid contact with other people at work or home, especially infants and children. Do not sneeze or cough on others, and wash your hands often. Keep your drinking glass and eating utensils separate from those of others, and wash these items well in hot, soapy water. · Gargle with warm salt water at least once each hour to help reduce swelling and make your throat feel better. Use 1 teaspoon of salt mixed in 8 fluid ounces of warm water. · Take an over-the-counter pain medication, such as acetaminophen (Tylenol), ibuprofen (Advil, Motrin), or naproxen (Aleve). Read and follow all instructions on the label. · Try an over-the-counter anesthetic throat spray or throat lozenges, which may help relieve throat pain. · Drink plenty of fluids. Fluids may help soothe an irritated throat. Hot fluids, such as tea or soup, may help your throat feel better. · Eat soft solids and drink plenty of clear liquids.  Flavored ice pops, ice cream, scrambled eggs, sherbet, and gelatin dessert (such as Jell-O) may also soothe the throat. · Get lots of rest.  · Do not smoke, and avoid secondhand smoke. If you need help quitting, talk to your doctor about stop-smoking programs and medicines. These can increase your chances of quitting for good. · Use a vaporizer or humidifier to add moisture to the air in your bedroom. Follow the directions for cleaning the machine. When should you call for help? Call your doctor now or seek immediate medical care if:  ? · You have a new or higher fever. ? · You have a fever with a stiff neck or severe headache. ? · You have new or worse trouble swallowing. ? · Your sore throat gets much worse on one side. ? · Your pain becomes much worse on one side of your throat. ? Watch closely for changes in your health, and be sure to contact your doctor if:  ? · You are not getting better after 2 days (48 hours). ? · You do not get better as expected. Where can you learn more? Go to http://ivan-tenzin.info/. Enter K625 in the search box to learn more about \"Strep Throat: Care Instructions. \"  Current as of: May 12, 2017  Content Version: 11.4  © 5827-2715 [employer]. Care instructions adapted under license by [employer] (which disclaims liability or warranty for this information). If you have questions about a medical condition or this instruction, always ask your healthcare professional. [address] any warranty or liability for your use of this information.

## 2017-11-13 NOTE — ED PROVIDER NOTES
HPI Comments: 27yoF with hx of HTN to ED c/o sore throat onset last night. Has associated painful swallowing. Denies fever, chills, drooling, cough. No ill contacts. Was unable to taker her BP meds this morning due to pain. Patient is a 28 y.o. female presenting with sore throat. The history is provided by the patient. Sore Throat    This is a new problem. The current episode started 12 to 24 hours ago. The problem has not changed since onset. There has been no fever. Associated symptoms include ear pain, headaches, swollen glands and trouble swallowing. Pertinent negatives include no diarrhea, no vomiting, no congestion, no drooling, no ear discharge, no plugged ear sensation, no shortness of breath, no stridor, no stiff neck and no cough. She has had no exposure to strep or mono. She has tried nothing for the symptoms. Past Medical History:   Diagnosis Date    Dental caries     Gastrointestinal disorder     Hernia removed    Hypertension     Ill-defined condition        Past Surgical History:   Procedure Laterality Date    HX GYN      4 sections    HX OTHER SURGICAL      tonsils and addenoids removed         No family history on file. Social History     Social History    Marital status:      Spouse name: N/A    Number of children: N/A    Years of education: N/A     Occupational History    Not on file. Social History Main Topics    Smoking status: Current Every Day Smoker     Packs/day: 0.50    Smokeless tobacco: Never Used    Alcohol use No    Drug use: No    Sexual activity: Not on file     Other Topics Concern    Not on file     Social History Narrative         ALLERGIES: Penicillins    Review of Systems   HENT: Positive for ear pain, sore throat and trouble swallowing. Negative for congestion, drooling and ear discharge. Respiratory: Negative for cough, shortness of breath and stridor. Gastrointestinal: Negative for diarrhea and vomiting.    Neurological: Positive for headaches. All other systems reviewed and are negative. Vitals:    11/13/17 0853   BP: (!) 174/116   Pulse: (!) 120   Resp: 15   Temp: 98.9 °F (37.2 °C)   SpO2: 95%   Weight: 106.6 kg (235 lb)            Physical Exam   Constitutional: She appears well-developed and well-nourished. HENT:   Head: Normocephalic and atraumatic. Right Ear: Hearing, tympanic membrane, external ear and ear canal normal.   Left Ear: Hearing, tympanic membrane, external ear and ear canal normal.   Nose: Nose normal.   Mouth/Throat: Uvula is midline. No trismus in the jaw. No uvula swelling. Oropharyngeal exudate and posterior oropharyngeal erythema present. No posterior oropharyngeal edema or tonsillar abscesses. Eyes: Conjunctivae and EOM are normal. Pupils are equal, round, and reactive to light. Neck: Normal range of motion. Neck supple. Cardiovascular: Regular rhythm. tachycardic   Pulmonary/Chest: Effort normal and breath sounds normal.   Lymphadenopathy:     She has no cervical adenopathy. Neurological: She is alert. Skin: Skin is warm and dry. Psychiatric: She has a normal mood and affect. MDM  Number of Diagnoses or Management Options  Acute streptococcal pharyngitis:   Essential hypertension:   Diagnosis management comments: Ddx: strep vs viral pharyngitis, mono, abscess, other. Patient is a 27yoF who is c/o sore throat since last night. Exam reveals red tonsils with exudates, no evidence of abscess. Strep positive. Rx for keflex 500mg BID x 10 days given pt's rxn of hives to pcn.  JOHAN Paige 9:49 AM         Amount and/or Complexity of Data Reviewed  Clinical lab tests: reviewed    Risk of Complications, Morbidity, and/or Mortality  Presenting problems: low  Diagnostic procedures: low  Management options: low    Patient Progress  Patient progress: improved    ED Course       Procedures

## 2017-11-13 NOTE — LETTER
[facility] EMERGENCY DEPT 
[address] [facility] [address] 
[phone] Work/School Note Date: 11/13/2017 To Whom It May concern: 
 
Yaya Yanez was seen and treated today in the emergency room by the following provider(s): 
Attending Provider: Chichi Dean DO Physician Assistant: JOHAN Marinelli. Yaya Yanez may return to work on 11/15/17. Sincerely, JOHAN Marinelli

## 2017-11-14 LAB
B-HEM STREP THROAT QL CULT: POSITIVE
BACTERIA SPEC CULT: ABNORMAL
SERVICE CMNT-IMP: ABNORMAL

## 2018-01-02 ENCOUNTER — HOSPITAL ENCOUNTER (EMERGENCY)
Age: 28
Discharge: LEFT BEFORE DISCHARGE | End: 2018-01-02
Attending: EMERGENCY MEDICINE
Payer: MEDICAID

## 2018-01-02 VITALS
HEART RATE: 95 BPM | WEIGHT: 235 LBS | DIASTOLIC BLOOD PRESSURE: 118 MMHG | SYSTOLIC BLOOD PRESSURE: 183 MMHG | BODY MASS INDEX: 44.37 KG/M2 | OXYGEN SATURATION: 96 % | HEIGHT: 61 IN | TEMPERATURE: 98 F | RESPIRATION RATE: 16 BRPM

## 2018-01-02 PROCEDURE — 75810000275 HC EMERGENCY DEPT VISIT NO LEVEL OF CARE

## 2018-01-03 NOTE — ED TRIAGE NOTES
Pt. States \"I have a huge cyst above where the hole is\" pt. Refers to area above rectum. Reports symptoms started \"Friday\".

## 2018-02-20 ENCOUNTER — HOSPITAL ENCOUNTER (OUTPATIENT)
Dept: ULTRASOUND IMAGING | Age: 28
Discharge: HOME | End: 2018-02-20
Attending: NURSE PRACTITIONER
Payer: COMMERCIAL

## 2018-02-20 DIAGNOSIS — R19.00 ABDOMINAL WALL BULGE: ICD-10-CM

## 2018-02-20 PROCEDURE — 76705 ECHO EXAM OF ABDOMEN: CPT

## 2018-03-12 ENCOUNTER — HOSPITAL ENCOUNTER (OUTPATIENT)
Dept: GENERAL RADIOLOGY | Age: 28
Discharge: HOME | End: 2018-03-12
Payer: COMMERCIAL

## 2018-03-12 DIAGNOSIS — M54.16 LUMBAR BACK PAIN WITH RADICULOPATHY AFFECTING LEFT LOWER EXTREMITY: ICD-10-CM

## 2018-03-12 PROCEDURE — 72100 X-RAY EXAM L-S SPINE 2/3 VWS: CPT

## 2018-03-20 ENCOUNTER — HOSPITAL ENCOUNTER (OUTPATIENT)
Dept: LAB | Age: 28
Discharge: HOME | End: 2018-03-20
Payer: COMMERCIAL

## 2018-03-20 LAB
BACTERIA SPEC CULT: NORMAL
ERYTHROCYTE [SEDIMENTATION RATE] IN BLOOD: 18 MM/HR (ref 0–20)
SERVICE CMNT-IMP: NORMAL

## 2018-03-20 PROCEDURE — 85652 RBC SED RATE AUTOMATED: CPT | Performed by: PHYSICIAN ASSISTANT

## 2018-03-20 PROCEDURE — 83631 LACTOFERRIN FECAL (QUANT): CPT | Performed by: PHYSICIAN ASSISTANT

## 2018-03-20 PROCEDURE — 87493 C DIFF AMPLIFIED PROBE: CPT | Performed by: PHYSICIAN ASSISTANT

## 2018-03-20 PROCEDURE — 87177 OVA AND PARASITES SMEARS: CPT | Performed by: PHYSICIAN ASSISTANT

## 2018-03-20 PROCEDURE — 83516 IMMUNOASSAY NONANTIBODY: CPT | Performed by: PHYSICIAN ASSISTANT

## 2018-03-20 PROCEDURE — 36415 COLL VENOUS BLD VENIPUNCTURE: CPT | Performed by: PHYSICIAN ASSISTANT

## 2018-03-20 PROCEDURE — 87186 SC STD MICRODIL/AGAR DIL: CPT | Performed by: PHYSICIAN ASSISTANT

## 2018-03-22 LAB
ENDOMYSIUM IGA SER QL: NEGATIVE
GLIADIN PEPTIDE IGA SER-ACNC: 4 UNITS (ref 0–19)
GLIADIN PEPTIDE IGG SER-ACNC: 2 UNITS (ref 0–19)
IGA SERPL-MCNC: 155 MG/DL (ref 87–352)
LACTOFERRIN, LCTFLT: <1 UG/ML(G) (ref 0–7.24)
TTG IGA SER-ACNC: <2 U/ML (ref 0–3)
TTG IGG SER-ACNC: <2 U/ML (ref 0–5)

## 2018-03-26 LAB
G LAMBLIA AG STL QL IA: NEGATIVE
O+P STL CONC: NORMAL
SPECIMEN SOURCE: NORMAL

## 2018-03-29 LAB
O&P RESULT 1, 080877: NORMAL
SOURCE, RSRC56: NORMAL

## 2018-04-13 ENCOUNTER — OFFICE VISIT (OUTPATIENT)
Dept: SURGERY | Age: 28
End: 2018-04-13

## 2018-04-13 VITALS
WEIGHT: 235 LBS | BODY MASS INDEX: 44.37 KG/M2 | OXYGEN SATURATION: 96 % | TEMPERATURE: 98 F | HEART RATE: 95 BPM | RESPIRATION RATE: 16 BRPM | SYSTOLIC BLOOD PRESSURE: 150 MMHG | HEIGHT: 61 IN | DIASTOLIC BLOOD PRESSURE: 88 MMHG

## 2018-04-13 DIAGNOSIS — M62.08 DIASTASIS RECTI: Primary | ICD-10-CM

## 2018-04-13 RX ORDER — METFORMIN HYDROCHLORIDE 500 MG/1
TABLET ORAL 2 TIMES DAILY WITH MEALS
COMMUNITY
End: 2021-07-21

## 2018-04-13 NOTE — PROGRESS NOTES
Progress Note    Patient: Raleigh Macias  MRN: [id number]  SSN: [id number]   YOB: 1990  Age: 33 y.o. Sex: female     Chief Complaint   Patient presents with    Advice Only     diastatsis recti       HPI    Ms. Misa cleary is a 76-year-old morbidly obese woman with a long history of a diastases recti. She has no symptoms of it and is concerned about its looks. Discussed with her the fact that these conditions do not get surgery and they are not dangerous. She is happy about not having to have surgery. Past Medical History:   Diagnosis Date    Dental caries     Gastrointestinal disorder     Hernia removed    Hypertension     Ill-defined condition      Past Surgical History:   Procedure Laterality Date    HX GYN      4 sections    HX OTHER SURGICAL      tonsils and addenoids removed     Allergies   Allergen Reactions    Penicillins Hives     Current Outpatient Prescriptions   Medication Sig Dispense Refill    metFORMIN (GLUCOPHAGE) 500 mg tablet Take  by mouth two (2) times daily (with meals).  spironolactone (ALDACTONE) 25 mg tablet Take 1 Tab by mouth daily. Indications: hypertension 30 Tab 0    carvedilol (COREG) 25 mg tablet Take 1 Tab by mouth two (2) times daily (with meals). Indications: hypertension 60 Tab 0    indapamide (LOZOL) 2.5 mg tablet Take 1 Tab by mouth daily. Indications: hypertension 30 Tab 0    acetaminophen-codeine (TYLENOL-CODEINE) 120-12 mg/5 mL solution 10mL every 4-6 hours prn pain 120 mL 0     Social History     Social History    Marital status:      Spouse name: N/A    Number of children: N/A    Years of education: N/A     Occupational History    Not on file.      Social History Main Topics    Smoking status: Current Every Day Smoker     Packs/day: 0.50    Smokeless tobacco: Never Used    Alcohol use No    Drug use: No    Sexual activity: Not on file     Other Topics Concern    Not on file     Social History Narrative     No family history on file. Review of systems:  Patient denies any reflux, emesis, abdominal pain, change in bowel habits, hematochezia, melena, fever, weight loss, fatigue chills, dermatitis, abnormal moles, change in vision, vertigo, epistaxis, dysphagia, hoarseness, chest pain, palpitations, hypertension, edema, cough, shortness of breath, wheezing, hemoptysis, snoring, hematuria, diabetes, thyroid disease, anemia, bruising, history of blood transfusion, dizziness, headache, or fainting. Physical Examination    Well developed well nourished female in no apparent distress  Visit Vitals    /88    Pulse 95    Temp 98 °F (36.7 °C) (Oral)    Resp 16    Ht 5' 1\" (1.549 m)    Wt 106.6 kg (235 lb)    SpO2 96%    BMI 44.4 kg/m2      Head: normocephalic, atraumatic  Mouth: Clear, no overt lesions, oral mucosa pink and moist  Neck: supple, no masses, no adenopathy or carotid bruits, trachea midline  Resp: clear to auscultation bilaterally, no wheeze, rhonchi or rales, excursions normal and symmetrical  Cardio: Regular rate and rhythm, no murmurs, clicks, gallops or rubs, no edema or varicosities  Abdomen: soft, nontender, nondistended, normoactive bowel sounds, no hernias, no hepatosplenomegaly, Diastasis recti  Back: Deferred  Extremeties: warm, well-perfused, no tenderness or swelling, normal gait/station  Neuro: sensation and strength grossly intact and symmetrical  Psych: alert and oriented to person, place and time  Breast exam deferred    IMPRESSION  Diastases rectally in a morbidly obese young woman. No Surgery planned    PLAN  Orders Placed This Encounter    metFORMIN (GLUCOPHAGE) 500 mg tablet     Sig: Take  by mouth two (2) times daily (with meals).      Follow-up as needed  Emanuel Rose MD

## 2018-04-13 NOTE — MR AVS SNAPSHOT
[facility] 
 
 
 [address] [facility] [address] [facility] [facility] [address] 
[phone] Patient: Sergio Boyer MRN: [id number] NRO:[id number] Visit Information Date & Time Provider Department Dept. Phone Encounter #  
 4/13/2018 10:30 AM Raven Sequeira MD [facility] [phone] Your Appointments 4/13/2018 10:30 AM  
Office Visit with Raven Sequeira MD  
[address] [facility]) Appt Note: diastais recti, viktor keith referred, bcbs fed  
 [address] [facility] [facility] [address]  
[phone]  
  
   
 [address] [facility] [address] [address] 5/3/2018  2:00 PM  
New Patient with Dee Dee Harrington MD  
[address] [facility] [facility]) Appt Note: Lumbar pain/valentina timo-NP  
 [address] [facility] [address]  
[phone]  
  
   
 [address] [facility] [address] Upcoming Health Maintenance Date Due Pneumococcal 19-64 Medium Risk (1 of 1 - PPSV23) 1/20/2008 DTaP/Tdap/Td series (1 - Tdap) 1/20/2010 PAP AKA CERVICAL CYTOLOGY 1/20/2010 Influenza Age 5 to Adult 8/1/2017 Allergies as of 4/13/2018  Review Complete On: 4/13/2018 By: Roc Osborne LPN Severity Noted Reaction Type Reactions Penicillins  04/03/2015    Hives Current Immunizations  Never Reviewed No immunizations on file. Not reviewed this visit Vitals BP Pulse Temp Resp Height(growth percentile) Weight(growth percentile) 150/88 95 98 °F (36.7 °C) (Oral) 16 5' 1\" (1.549 m) 235 lb (106.6 kg) SpO2 BMI OB Status Smoking Status 96% 44.4 kg/m2 Having regular periods Current Every Day Smoker Vitals History BMI and BSA Data Body Mass Index Body Surface Area 44.4 kg/m 2 2.14 m 2 Your Updated Medication List  
  
   
 This list is accurate as of 4/13/18 10:29 AM.  Always use your most recent med list.  
  
  
  
  
 acetaminophen-codeine 120-12 mg/5 mL solution Commonly known as:  TYLENOL-CODEINE  
10mL every 4-6 hours prn pain  
  
 carvedilol 25 mg tablet Commonly known as:  Augustina Menghini Take 1 Tab by mouth two (2) times daily (with meals). Indications: hypertension  
  
 indapamide 2.5 mg tablet Commonly known as:  Lavone Frees Take 1 Tab by mouth daily. Indications: hypertension  
  
 metFORMIN 500 mg tablet Commonly known as:  GLUCOPHAGE Take  by mouth two (2) times daily (with meals). spironolactone 25 mg tablet Commonly known as:  ALDACTONE Take 1 Tab by mouth daily. Indications: hypertension Introducing [facility] SERVICES! Shobha Kearney introduces [employer] Internet patient portal. Now you can access parts of your medical record, email your doctor's office, and request medication refills online. 1. In your internet browser, go to [email or web address] 2. Click on the First Time User? Click Here link in the Sign In box. You will see the New Member Sign Up page. 3. Enter your [employer] Internet Access Code exactly as it appears below. You will not need to use this code after youve completed the sign-up process. If you do not sign up before the expiration date, you must request a new code. · [employer] Internet Access Code: [email or web address] Expires: 5/13/2018  2:51 PM 
 
4. Enter the last four digits of your Social Security Number (xxxx) and Date of Birth (mm/dd/yyyy) as indicated and click Submit. You will be taken to the next sign-up page. 5. Create a [employer]igator Internet ID. This will be your [employer] login ID and cannot be changed, so think of one that is secure and easy to remember. 6. Create a [employer] password. You can change your password at any time. 7. Enter your Password Reset Question and Answer. This can be used at a later time if you forget your password. 8. Enter your e-mail address. You will receive e-mail notification when new information is available in [address] 9. Click Sign Up. You can now view and download portions of your medical record. 10. Click the Download Summary menu link to download a portable copy of your medical information. If you have questions, please visit the Frequently Asked Questions section of the [employer] website. Remember, [employer] is NOT to be used for urgent needs. For medical emergencies, dial 911. Now available from your iPhone and Android! Please provide this summary of care documentation to your next provider. Your primary care clinician is listed as Phys Other. If you have any questions after today's visit, please call [phone].

## 2018-06-13 ENCOUNTER — HOSPITAL ENCOUNTER (OUTPATIENT)
Dept: PHYSICAL THERAPY | Age: 28
Discharge: HOME | End: 2018-06-13
Payer: COMMERCIAL

## 2018-06-13 PROCEDURE — 97110 THERAPEUTIC EXERCISES: CPT | Performed by: PHYSICAL THERAPIST

## 2018-06-13 PROCEDURE — 97161 PT EVAL LOW COMPLEX 20 MIN: CPT | Performed by: PHYSICAL THERAPIST

## 2018-06-13 NOTE — PROGRESS NOTES
[facility] [place] [address]  [address], [address] [phone] fax    Plan of Care/ Statement of Necessity for Physical Therapy Services           Patient name: Retta Sandhoff Start of Care: 2018   Referral source: Gen Killian MD : 1990    Medical Diagnosis: Cervicalgia [M54.2]  S/P cervical spinal fusion [Z98.1]   Onset Date:May 2018    Treatment Diagnosis: s/p cervical fusion 18   Prior Hospitalization: see medical history Provider#: [id number]   Medications: Verified on Patient summary List    Comorbidities: Keppelfeil syndrome, high BP, DM, s/p cervical fusion   Prior Level of Function: Pt reports prior [place] with all daily tasks. She lives at home with her  and children. She notes 19 stairs in her home. The Plan of Care and following information is based on the information from the initial evaluation. Assessment/ key information: Pt is a 32 y/o female who presents today s/p cervical fusion on 18. Pt reports that she was on vacation with her family in [place] when she woke up and was unable to use her legs. Pt went to the ER in [place] and had an MRI. She was transferred by ambulance to a hospital in [facility], where her MRI was repeated. Pt was prepped for surgery, but experienced pulmonary edema and a tracheotomy was performed to allow her to breath. Pt was placed in a coma x 4 days, and then they did her cervical fusion. She spent 2 weeks in the ICU, and then was transferred to a rehab unit where she received 90 minutes of therapy daily and learned to reuse her arms and legs. Pt notes decreased balance, reduced standing tolerance (20 minute max), and walking tolerance (household distances). She notes receiving assistance at home for cooking/cleaning tasks. She is able to dress and bathe without assistance, but with increased fatigue. Pt demonstrates reduced cervical mobility and BLE strength with MMT today.   Pt reports no precautions from her surgery other than to avoid lifting x 4 weeks. She notes intermittent LE symptoms (numbness/tingling), but denies symptoms in the B UE. Pt would benefit from skilled PT intervention to address the above limitations and return her to full PLOF. Evaluation Complexity History HIGH Complexity :3+ comorbidities / personal factors will impact the outcome/ POC ; Examination MEDIUM Complexity : 3 Standardized tests and measures addressing body structure, function, activity limitation and / or participation in recreation  ;Presentation LOW Complexity : Stable, uncomplicated  ;Clinical Decision Making MEDIUM Complexity : FOTO score of 26-74  Overall Complexity Rating: LOW   Problem List: pain affecting function, decrease ROM, decrease strength, impaired gait/ balance, decrease ADL/ functional abilitiies, decrease activity tolerance and decrease flexibility/ joint mobility   Treatment Plan may include any combination of the following: Therapeutic exercise, Therapeutic activities, Neuromuscular re-education, Physical agent/modality, Gait/balance training, Manual therapy, Patient education, Self Care training, Functional mobility training and Stair training  Patient / Family readiness to learn indicated by: asking questions, trying to perform skills and interest  Persons(s) to be included in education: patient (P)  Barriers to Learning/Limitations: None  Patient Goal (s): to gain strength  Patient Self Reported Health Status: good  Rehabilitation Potential: good  Short Term Goals: To be accomplished in 2 weeks:  1. Pt to report [place] with HEP. Eval status: HEP issued and reviewed physically/verbally with pt    Long Term Goals: To be accomplished in 4 weeks:  1. Pt to report score of 69 on FOTO, indicating improved tolerance to activity and reduced pain. Eval status: 52 on initial FOTO  2.   Pt to demonstrate full 5/5 B knee strength with MMT, indicating improved tolerance to activity and reduced fall risk. Eval status: 4-/5 hip flexion, 4+/5 knee flexion, 4+/5 knee extension, 4/5 DF  3. Pt to be able to tolerate standing/walking 8 hours with no increased pain to be able to accomplish work tasks. Eval status: pt limited to 20 minutes of standing activity  4. Pt to demonstrate full cervical ROM in all planes with no increased c/o pain to improve mobility and reduce fall risk. Eval status: pt with 50% limited flexion, 75% limited extension, 75% limited R/L lateral flexion, 75% limited rotation R/L    Frequency / Duration: Patient to be seen 2 times per week for 10 treatments. Patient/ Caregiver education and instruction: Diagnosis, prognosis, self care, activity modification and exercises   [x]  Plan of care has been reviewed with JAC Ahumada, PT 6/13/2018 11:06 AM    ________________________________________________________________________    I certify that the above Therapy Services are being furnished while the patient is under my care. I agree with the treatment plan and certify that this therapy is necessary.     Physician's Signature:____________________  Date:____________Time: _________    Please sign and return to [facility] [place] [address]  [address] [place], [address]  [phone] fax

## 2018-06-13 NOTE — PROGRESS NOTES
PT DAILY TREATMENT NOTE     Patient Name: Sergio Boyer  Date:2018  : 1990  [x]  Patient  Verified  Payor: BLUE CROSS / Plan: [employer] [facility] / Product Type: PPO /    In time:1013  Out time:1058  Total Treatment Time (min): 45  Visit #: 1 of 10    Treatment Area: Cervicalgia [M54.2]  S/P cervical spinal fusion [Z98.1]    SUBJECTIVE  Pain Level (0-10 scale): 2  Any medication changes, allergies to medications, adverse drug reactions, diagnosis change, or new procedure performed?: [x] No    [] Yes (see summary sheet for update)  Subjective functional status/changes:   [] No changes reported  See eval.    OBJECTIVE    Modality rationale: decrease inflammation and decrease pain to improve the patients ability to tolerate daily activity with reduced symptoms.    Min Type Additional Details    [] Estim:  []Unatt       []IFC  []Premod                        []Other:  []w/ice   []w/heat  Position:  Location:    [] Estim: []Att    []TENS instruct  []NMES                    []Other:  []w/US   []w/ice   []w/heat  Position:  Location:    []  Traction: [] Cervical       []Lumbar                       [] Prone          []Supine                       []Intermittent   []Continuous Lbs:  [] before manual  [] after manual    []  Ultrasound: []Continuous   [] Pulsed                           []1MHz   []3MHz W/cm2:  Location:    []  Iontophoresis with dexamethasone         Location: [] Take home patch   [] In clinic   10 [x]  Ice     []  heat  []  Ice massage  []  Laser   []  Anodyne Position:seated  Location:cervical spine    []  Laser with stim  []  Other:  Position:  Location:    []  Vasopneumatic Device Pressure:       [] lo [] med [] hi   Temperature: [] lo [] med [] hi   [x] Skin assessment post-treatment:  [x]intact []redness- no adverse reaction    []redness - adverse reaction:     10 min []Eval                  []Re-Eval       25 min Therapeutic Exercise:  [] See flow sheet :   Rationale: increase ROM, improve coordination and increase proprioception to improve the patients ability to tolerate daily tasks with reduced symptoms. With   [] TE   [] TA   [] neuro   [] other: Patient Education: [x] Review HEP    [] Progressed/Changed HEP based on:   [] positioning   [] body mechanics   [] transfers   [] heat/ice application    [] other:      Other Objective/Functional Measures: See eval.     Pain Level (0-10 scale) post treatment: 2    ASSESSMENT/Changes in Function:   [x]  See Plan of Care           Progress towards goals / Updated goals:  Short Term Goals: To be accomplished in 2 weeks:  1. Pt to report [place] with HEP. Eval status: HEP issued and reviewed physically/verbally with pt    Long Term Goals: To be accomplished in 4 weeks:  1. Pt to report score of 69 on FOTO, indicating improved tolerance to activity and reduced pain. Eval status: 52 on initial FOTO  2. Pt to demonstrate full 5/5 B knee strength with MMT, indicating improved tolerance to activity and reduced fall risk. Eval status: 4-/5 hip flexion, 4+/5 knee flexion, 4+/5 knee extension, 4/5 DF  3. Pt to be able to tolerate standing/walking 8 hours with no increased pain to be able to accomplish work tasks. Eval status: pt limited to 20 minutes of standing activity  4. Pt to demonstrate full cervical ROM in all planes with no increased c/o pain to improve mobility and reduce fall risk. Eval status: pt with 50% limited flexion, 75% limited extension, 75% limited R/L lateral flexion, 75% limited rotation R/L        PLAN  [x]  Upgrade activities as tolerated     [x]  Continue plan of care  []  Update interventions per flow sheet       []  Discharge due to:_  []  Other:_      Yogi Rocha, PT 6/13/2018  10:54 AM    No future appointments.

## 2018-06-15 ENCOUNTER — HOSPITAL ENCOUNTER (OUTPATIENT)
Dept: PHYSICAL THERAPY | Age: 28
Discharge: HOME | End: 2018-06-15
Payer: COMMERCIAL

## 2018-06-15 PROCEDURE — 97112 NEUROMUSCULAR REEDUCATION: CPT

## 2018-06-15 PROCEDURE — 97110 THERAPEUTIC EXERCISES: CPT

## 2018-06-15 NOTE — PROGRESS NOTES
[facility] [place] [address]  [address], [address] [phone] fax    Discharge Summary  Patient name: Linda Joy Start of Care: 2018   Referral source: Nj Soto MD : 1990                          Medical Diagnosis: Cervicalgia [M54.2]  S/P cervical spinal fusion [Z98.1] Onset Date:May 2018                          Treatment Diagnosis: s/p cervical fusion 18   Prior Hospitalization: see medical history Provider#: [id number]   Medications: Verified on Patient summary List    Comorbidities: Keppelfeil syndrome, high BP, DM, s/p cervical fusion   Prior Level of Function: Pt reports prior [place] with all daily tasks. She lives at home with her  and children. She notes 19 stairs in her home. Visits from Start of Care: 2    Missed Visits: 0    Reporting Period : 18 to 6/15/18    Short Term Goals: To be accomplished in 2 weeks:  1.  Pt to report [place] with HEP. Eval status: HEP issued and reviewed physically/verbally with pt                        MET  Long Term Goals: To be accomplished in 4 weeks:  1.  Pt to report score of 69 on FOTO, indicating improved tolerance to activity and reduced pain. Eval status: 52 on initial FOTO    NOT MET  2.  Pt to demonstrate full 5/5 B knee strength with MMT, indicating improved tolerance to activity and reduced fall risk. Eval status: 4-/5 hip flexion, 4+/5 knee flexion, 4+/5 knee extension, 4/5 DF    NOT MET  3.  Pt to be able to tolerate standing/walking 8 hours with no increased pain to be able to accomplish work tasks. Eval status: pt limited to 20 minutes of standing activity    NOT MET  4.  Pt to demonstrate full cervical ROM in all planes with no increased c/o pain to improve mobility and reduce fall risk.                         Eval status: pt with 50% limited flexion, 75% limited extension, 75% limited R/L lateral flexion, 75% limited rotation R/L    NOT MET    Assessment/Summary of care: Ms. Mihir Fall has been a pleasure to treat during her short bout of care. She self-discharged after today's appointment secondary to financial burden as her  has just lost his job.     RECOMMENDATIONS:  [x]Discontinue therapy: []Patient has reached or is progressing toward set goals      [x]Patient has abdicated      []Due to lack of appreciable progress towards set goals    Han Beckham, PT 6/15/2018 2:57 PM

## 2018-06-15 NOTE — PROGRESS NOTES
PT DAILY TREATMENT NOTE     Patient Name: Walter Shah  Date:6/15/2018  : 1990  [x]  Patient  Verified  Payor: BLUE CROSS / Plan: [employer] [facility] / Product Type: PPO /    In time:1058  Out time: 1146  Total Treatment Time (min): 48  Visit #: 2 of 10    Treatment Area: Cervicalgia [M54.2]  S/P cervical spinal fusion [Z98.1]    SUBJECTIVE  Pain Level (0-10 scale): 0  Any medication changes, allergies to medications, adverse drug reactions, diagnosis change, or new procedure performed?: [x] No    [] Yes (see summary sheet for update)  Subjective functional status/changes:   [] No changes reported  \"No pain. \"    OBJECTIVE    Modality rationale: decrease pain to improve the patients ability to improve mobility and positional tolerance   Min Type Additional Details    [] Estim:  []Unatt       []IFC  []Premod                        []Other:  []w/ice   []w/heat  Position:  Location:    [] Estim: []Att    []TENS instruct  []NMES                    []Other:  []w/US   []w/ice   []w/heat  Position:  Location:    []  Traction: [] Cervical       []Lumbar                       [] Prone          []Supine                       []Intermittent   []Continuous Lbs:  [] before manual  [] after manual    []  Ultrasound: []Continuous   [] Pulsed                           []1MHz   []3MHz W/cm2:  Location:    []  Iontophoresis with dexamethasone         Location: [] Take home patch   [] In clinic   10 []  Ice     [x]  heat  []  Ice massage  []  Laser   []  Anodyne Position: seated   Location: neck    []  Laser with stim  []  Other:  Position:  Location:    []  Vasopneumatic Device Pressure:       [] lo [] med [] hi   Temperature: [] lo [] med [] hi   [x] Skin assessment post-treatment:  [x]intact []redness- no adverse reaction    []redness - adverse reaction:     10 min Therapeutic Exercise:  [x] See flow sheet :   Rationale: increase ROM and increase strength to improve the patients ability to perform ADLs    28 min Neuromuscular Re-education:  [x]  See flow sheet : postural re-ed activities   Rationale: increase ROM, increase strength, improve coordination, improve balance and increase proprioception  to improve the patients ability to improve mobility and upright posture        With   [x] TE   [] TA   [x] neuro   [] other: Patient Education: [x] Review HEP    [] Progressed/Changed HEP based on:   [x] positioning   [x] body mechanics   [] transfers   [] heat/ice application    [] other:      Other Objective/Functional Measures:      Pain Level (0-10 scale) post treatment: 0    ASSESSMENT/Changes in Function: Initiated POC. Pt was fatigued by the end of treatment. Her balance was challenged with lateral ambulation. Patient will continue to benefit from skilled PT services to modify and progress therapeutic interventions, address functional mobility deficits, address ROM deficits, address strength deficits, analyze and address soft tissue restrictions, analyze and cue movement patterns, analyze and modify body mechanics/ergonomics, assess and modify postural abnormalities, address imbalance/dizziness and instruct in home and community integration to attain remaining goals. [x]  See Plan of Care  []  See progress note/recertification  []  See Discharge Summary         Progress towards goals / Updated goals:  Short Term Goals: To be accomplished in 2 weeks:  1. Pt to report [place] with HEP. Eval status: HEP issued and reviewed physically/verbally with pt    MET  Long Term Goals: To be accomplished in 4 weeks:  1. Pt to report score of 69 on FOTO, indicating improved tolerance to activity and reduced pain. Eval status: 52 on initial FOTO  2. Pt to demonstrate full 5/5 B knee strength with MMT, indicating improved tolerance to activity and reduced fall risk. Eval status: 4-/5 hip flexion, 4+/5 knee flexion, 4+/5 knee extension, 4/5 DF  3.   Pt to be able to tolerate standing/walking 8 hours with no increased pain to be able to accomplish work tasks. Eval status: pt limited to 20 minutes of standing activity  4. Pt to demonstrate full cervical ROM in all planes with no increased c/o pain to improve mobility and reduce fall risk.    Eval status: pt with 50% limited flexion, 75% limited extension, 75% limited R/L lateral flexion, 75% limited rotation R/L    PLAN  []  Upgrade activities as tolerated     [x]  Continue plan of care  []  Update interventions per flow sheet       []  Discharge due to:_  []  Other:_      Aria Peoples, JAC 6/15/2018  11:47 AM    Future Appointments  Date Time Provider Laure Blas   6/18/2018 11:00 AM Rashida Montiel, PT [facility]   6/20/2018 11:00 AM Nirali Mccarthy, PT [facility]

## 2018-06-18 ENCOUNTER — APPOINTMENT (OUTPATIENT)
Dept: PHYSICAL THERAPY | Age: 28
End: 2018-06-18
Payer: COMMERCIAL

## 2018-06-20 ENCOUNTER — APPOINTMENT (OUTPATIENT)
Dept: PHYSICAL THERAPY | Age: 28
End: 2018-06-20
Payer: COMMERCIAL

## 2018-07-08 ENCOUNTER — HOSPITAL ENCOUNTER (EMERGENCY)
Age: 28
Discharge: HOME | End: 2018-07-08
Attending: EMERGENCY MEDICINE
Payer: COMMERCIAL

## 2018-07-08 VITALS
OXYGEN SATURATION: 99 % | HEART RATE: 89 BPM | RESPIRATION RATE: 20 BRPM | SYSTOLIC BLOOD PRESSURE: 156 MMHG | HEIGHT: 61 IN | BODY MASS INDEX: 44.37 KG/M2 | WEIGHT: 235 LBS | DIASTOLIC BLOOD PRESSURE: 99 MMHG | TEMPERATURE: 99.1 F

## 2018-07-08 DIAGNOSIS — G62.9 NEUROPATHY: Primary | ICD-10-CM

## 2018-07-08 PROCEDURE — 99283 EMERGENCY DEPT VISIT LOW MDM: CPT

## 2018-07-08 PROCEDURE — 99282 EMERGENCY DEPT VISIT SF MDM: CPT

## 2018-07-08 PROCEDURE — 96372 THER/PROPH/DIAG INJ SC/IM: CPT

## 2018-07-08 PROCEDURE — 74011250636 HC RX REV CODE- 250/636: Performed by: PHYSICIAN ASSISTANT

## 2018-07-08 RX ORDER — PREDNISONE 10 MG/1
TABLET ORAL
Qty: 21 TAB | Refills: 0 | Status: SHIPPED | OUTPATIENT
Start: 2018-07-08 | End: 2018-07-23 | Stop reason: ALTCHOICE

## 2018-07-08 RX ORDER — DEXAMETHASONE SODIUM PHOSPHATE 4 MG/ML
10 INJECTION, SOLUTION INTRA-ARTICULAR; INTRALESIONAL; INTRAMUSCULAR; INTRAVENOUS; SOFT TISSUE ONCE
Status: COMPLETED | OUTPATIENT
Start: 2018-07-08 | End: 2018-07-08

## 2018-07-08 RX ADMIN — DEXAMETHASONE SODIUM PHOSPHATE 10 MG: 4 INJECTION, SOLUTION INTRAMUSCULAR; INTRAVENOUS at 19:15

## 2018-07-08 NOTE — ED PROVIDER NOTES
EMERGENCY DEPARTMENT HISTORY AND PHYSICAL EXAM 
 
Date: 7/8/2018 Patient Name: Jeet Llanes History of Presenting Illness Chief Complaint Patient presents with  Arm Pain History Provided By: Patient Chief Complaint: arm pain Duration: 2 Days Timing:  Acute and Constant Location: right arm Quality: Aching and numbness Severity: 7 out of 10 Modifying Factors: history of C spine surgery in May 2018 Associated Symptoms: denies any other associated signs or symptoms Additional History (Context): Jeet Llanes is a 33 y.o. female with spinal cord compression with subsequent discectomy and C4/C5 fusion in May 2018 who presents with right arm pain and numbness. Pt states pain from right shoulder and numbness from elbow to fingers. Denies any injuries or similar sensation to left arm. Denies any fever, chills or loss of function to right arm. No HAs or dizziness. PCP: Hermelinda Guerin MD 
 
Current Outpatient Prescriptions Medication Sig Dispense Refill  predniSONE (STERAPRED DS) 10 mg dose pack Per pack directions 21 Tab 0  
 metFORMIN (GLUCOPHAGE) 500 mg tablet Take  by mouth two (2) times daily (with meals).  spironolactone (ALDACTONE) 25 mg tablet Take 1 Tab by mouth daily. Indications: hypertension 30 Tab 0  carvedilol (COREG) 25 mg tablet Take 1 Tab by mouth two (2) times daily (with meals). Indications: hypertension 60 Tab 0  
 indapamide (LOZOL) 2.5 mg tablet Take 1 Tab by mouth daily. Indications: hypertension 30 Tab 0 Past History Past Medical History: 
Past Medical History:  
Diagnosis Date  Dental caries  Gastrointestinal disorder Hernia removed  Hypertension  Ill-defined condition Past Surgical History: 
Past Surgical History:  
Procedure Laterality Date  HX GYN    
 4 sections  HX OTHER SURGICAL    
 tonsils and addenoids removed  NEUROLOGICAL PROCEDURE UNLISTED    
 spinal cord surgery 5/2018 Family History: 
History reviewed. No pertinent family history. Social History: 
Social History Substance Use Topics  Smoking status: Former Smoker Packs/day: 0.50 Quit date: 5/8/2018  Smokeless tobacco: Never Used  Alcohol use No  
 
 
Allergies: Allergies Allergen Reactions  Penicillins Hives Review of Systems Review of Systems Constitutional: Negative for chills and fever. HENT: Negative. Respiratory: Negative. Cardiovascular: Negative. Gastrointestinal: Negative. Musculoskeletal: Positive for arthralgias. Negative for back pain and neck pain. Neurological: Positive for numbness. Negative for dizziness and weakness. All other systems reviewed and are negative. All Other Systems Negative Physical Exam  
 
Vitals:  
 07/08/18 1748 BP: (!) 156/99 Pulse: 89 Resp: 20 Temp: 99.1 °F (37.3 °C) SpO2: 99% Weight: 106.6 kg (235 lb) Height: 5' 1\" (1.549 m) Physical Exam  
Constitutional: She appears well-developed and well-nourished. No distress. Appears comfortable in bed, texting on phone HENT:  
Head: Normocephalic and atraumatic. Right Ear: External ear normal.  
Left Ear: External ear normal.  
Nose: Nose normal.  
Mouth/Throat: Oropharynx is clear and moist.  
Eyes: Conjunctivae and EOM are normal. Pupils are equal, round, and reactive to light. Right eye exhibits no discharge. Left eye exhibits no discharge. Neck: Normal range of motion and full passive range of motion without pain. Neck supple. No spinous process tenderness and no muscular tenderness present. No rigidity. No edema, no erythema and normal range of motion present. Cardiovascular: Normal rate and regular rhythm. Pulmonary/Chest: Effort normal and breath sounds normal.  
Musculoskeletal:  
     Right shoulder: She exhibits normal range of motion, no tenderness, no swelling and no pain.   
     Left shoulder: Normal.  
     Right elbow: Normal. 
     Right wrist: Normal.  
     Cervical back: She exhibits normal range of motion, no tenderness, no bony tenderness, no swelling, no edema, no deformity and no pain. Thoracic back: Normal.  
     Lumbar back: Normal.  
     Right hand: She exhibits normal range of motion, no tenderness, no bony tenderness, normal two-point discrimination, normal capillary refill, no deformity, no laceration and no swelling. Normal strength noted. Lymphadenopathy:  
  She has no cervical adenopathy. Neurological: She is alert. She has normal strength. Coordination and gait normal.  
Slight dec in sharp sensation to right fingers Skin: Skin is warm and dry. She is not diaphoretic. Psychiatric: She has a normal mood and affect. Diagnostic Study Results Labs - No results found for this or any previous visit (from the past 12 hour(s)). Radiologic Studies - No orders to display CT Results  (Last 48 hours) None CXR Results  (Last 48 hours) None Medical Decision Making I am the first provider for this patient. I reviewed the vital signs, available nursing notes, past medical history, past surgical history, family history and social history. Vital Signs-Reviewed the patient's vital signs. Pulse Oximetry Analysis - 99% on RA Records Reviewed: Nursing Notes, Old Medical Records, Previous Radiology Studies and Previous Laboratory Studies Procedures: 
Procedures Provider Notes (Medical Decision Making): Pt is a 28yoF with hx of spinal cord compression and discectomy with c4/c5 fusion in May 2018 who presents with right arm pain from shoulder down and numbness from elbow down. Slight dec (3/5) in sharp sensation to fingertips. Motor intact. Suspect she has nerve impingement in shoulder or even c spine. Symptoms are unilateral. Will treat with steroids, IM here and pack for rx. Return precautions discussed. Pt agrees with plan.   
 
MED RECONCILIATION: 
No current facility-administered medications for this encounter. Current Outpatient Prescriptions Medication Sig  predniSONE (STERAPRED DS) 10 mg dose pack Per pack directions  metFORMIN (GLUCOPHAGE) 500 mg tablet Take  by mouth two (2) times daily (with meals).  spironolactone (ALDACTONE) 25 mg tablet Take 1 Tab by mouth daily. Indications: hypertension  carvedilol (COREG) 25 mg tablet Take 1 Tab by mouth two (2) times daily (with meals). Indications: hypertension  indapamide (LOZOL) 2.5 mg tablet Take 1 Tab by mouth daily. Indications: hypertension Disposition: 
discharge DISCHARGE NOTE:  
 
Pt has been reexamined. Patient has no new complaints, changes, or physical findings. Care plan outlined and precautions discussed. All medications were reviewed with the patient; will d/c home with pred dose pack. All of pt's questions and concerns were addressed. Patient was instructed and agrees to follow up with pcp, ortho spine, as well as to return to the ED upon further deterioration. Patient is ready to go home. Follow-up Information None Current Discharge Medication List  
  
START taking these medications Details  
predniSONE (STERAPRED DS) 10 mg dose pack Per pack directions Qty: 21 Tab, Refills: 0 Diagnosis Clinical Impression: 1. Neuropathy

## 2018-07-08 NOTE — ED TRIAGE NOTES
Pt c/o right arm pain and numbness since yesterday morning.  had spinal surgery in May in [facility] for a crushed spinal cord.  has a plate in her neck.  went into pulmonary edema prior to surgery.  Pt drove herself here

## 2018-07-23 ENCOUNTER — OFFICE VISIT (OUTPATIENT)
Dept: NEUROLOGY | Age: 28
End: 2018-07-23

## 2018-07-23 VITALS
RESPIRATION RATE: 20 BRPM | OXYGEN SATURATION: 97 % | WEIGHT: 236.2 LBS | DIASTOLIC BLOOD PRESSURE: 108 MMHG | BODY MASS INDEX: 44.6 KG/M2 | HEIGHT: 61 IN | HEART RATE: 94 BPM | SYSTOLIC BLOOD PRESSURE: 150 MMHG | TEMPERATURE: 98.6 F

## 2018-07-23 DIAGNOSIS — M48.02 CERVICAL STENOSIS OF SPINE: Primary | ICD-10-CM

## 2018-07-23 NOTE — PROGRESS NOTES
HPI: 65-year-old female referred by her primary care physician for evaluation of spinal stenosis. She has a history of Klippel-Feil syndrome. While [place]ing in [facility] in [place] she developed relatively acute onset of bilateral leg numbness and weakness. She went to the emergency room. During the evaluation she developed respiratory failure, she needed to be intubated, she needed a tracheostomy. She had an MRI of the C spine revealing C4-5 cord compression, so she had an anterior decompression and fusion at this level. She went to Rehab in [facility] for a week after a one-month stay in the hospital and she she reports that she has done very well since her surgery, although she still has some numbness of the right upper extremity. She denies any pain. Was discharged, making great progress throughout her hospital stay as well as in rehab. Denies any incontinence. She did have one follow-up with  Neurosurgery  in [facility] and told after x-rays that the plates that had been placed in the surgery looked okay. Social History     Social History    Marital status:      Spouse name: N/A    Number of children: N/A    Years of education: N/A     Occupational History    Not on file. Social History Main Topics    Smoking status: Former Smoker     Packs/day: 0.50     Quit date: 5/8/2018    Smokeless tobacco: Never Used    Alcohol use No    Drug use: No    Sexual activity: Not on file     Other Topics Concern    Not on file     Social History Narrative       No family history on file. Current Outpatient Prescriptions   Medication Sig Dispense Refill    predniSONE (STERAPRED DS) 10 mg dose pack Per pack directions 21 Tab 0    metFORMIN (GLUCOPHAGE) 500 mg tablet Take  by mouth two (2) times daily (with meals).  spironolactone (ALDACTONE) 25 mg tablet Take 1 Tab by mouth daily.  Indications: hypertension 30 Tab 0    carvedilol (COREG) 25 mg tablet Take 1 Tab by mouth two (2) times daily (with meals). Indications: hypertension 60 Tab 0    indapamide (LOZOL) 2.5 mg tablet Take 1 Tab by mouth daily. Indications: hypertension 30 Tab 0       Past Medical History:   Diagnosis Date    Dental caries     Gastrointestinal disorder     Hernia removed    Hypertension     Ill-defined condition        Past Surgical History:   Procedure Laterality Date    HX GYN      4 sections    HX OTHER SURGICAL      tonsils and addenoids removed    NEUROLOGICAL PROCEDURE UNLISTED      spinal cord surgery 5/2018       Allergies   Allergen Reactions    Penicillins Hives and Rash       Patient Active Problem List   Diagnosis Code    Abdominal pain, other specified site R10.9    Pain due to dental caries K02.9    Secondary hypertension I15.9    Diastasis recti M62.08         Review of Systems:   Constitutional: no fever or chills  Skin denies rash or itching  HEENT:  Denies tinnitus, hearing loss, or visual changes  Respiratory: denies shortness of breath  Cardiovascular: denies chest pain, dyspnea on exertion  Gastrointestinal: does not report nausea or vomiting  Genitourinary: does not report dysuria or incontinence  Musculoskeletal: does not report joint pain or swelling  Endocrine: denies weight change  Hematology: denies easy bruising or bleeding   Neurological: as above in HPI      PHYSICAL EXAMINATION:      VITAL SIGNS:    Visit Vitals    Ht 5' 1\" (1.549 m)    Wt 107.1 kg (236 lb 3.2 oz)    LMP 07/21/2018 (Exact Date)    BMI 44.63 kg/m2       GENERAL: Well developed, obese, in no apparent distress. HEART: RR, no murmurs heard, no carotid bruits  EXTREMITIES: No clubbing, cyanosis, or edema is identified. Pulses 2+    and symmetrical.  HEAD:   Normocephalic, atraumatic. NEUROLOGIC EXAMINATION    MENTAL STATUS: Awake, alert, and oriented x 4. Attention and STM are grossly normal. There is no aphasia. Fund of knowledge is adequate.   Mood and affect are appropriate  CRANIAL NERVES: Visual fields are full to confrontation. Pupils are reactive to light and accommodation. Extraocular movements are intact and there is no nystagmus. Facial sensation is normal  Face is symmetrical.   Hearing is present. SCM/TPZ 5/5  Palate rises symmetrically. Tongue is in the midline. MOTOR:   The patient is 5/5 in all four limbs with a mild right upper extremity drift     CEREBELLAR: No tremors or dysmetria    SENSORY:  Normal PP, vibration, propioception. Romberg neg    DTR's:   +3 throughout, +2 ankle clonus    GAIT:   Very subtle right lower extremity limp, no myelopathic signs on examination        MRI 5/11/2018:  Imaging demonstrates a limited evaluation of the posterior fossa    which appears unremarkable. Again noted is osseous fusion of    C3-C4, C6-C7, and C7-T1. The cord demonstrates normal signal    intensity and morphology with the exception of T2 hyperintensity    at the level of C4-C5 consistent with cord edema. Marrow signal    is benign.  Paraspinous soft tissues are within normal limits.     Spinal alignment is preserved.        Spinal levels:    C2-3:  Disc ridging resulting in moderate central canal stenosis.    Mild bilateral neuroforaminal compromise.    C3-4:  Normal.      C4-5:  Disc herniation resulting in severe spinal canal stenosis    with increased cord signal consistent with edema. Moderate left    neuroforaminal stenosis. Moderate to severe right neuroforaminal    stenosis.    C5-6:  Mild disk ridging with no significant central canal    stenosis. No significant neuroforaminal compromise.    C6-7:  Normal.      C7-T1: Normal.           Impression/Plan: S/P cord compression C?4-5, s/p ADCF, with residual right arm numbness and weakness.   I discussed that it has been only about 2 months since her surgery and that she may continue to recover some of her very minimal cervical myelopathy at the C4-5 level, status post anterior decompression and fusion in May of 14th 2018 strength and sensation she has lost over a period of 6-12 months. At this point no further interventions will accelerate this process. Other than this she has made a remarkable recovery. She does not need neurological intervention. If there are questions regarding her structural spine disease in the future, she was advised to go to the [facility], which is where she was going to go before she went on vacation. She does not need to follow-up with neurology anymore. PLEASE NOTE:   Portions of this document may have been produced using voice recognition software. Unrecognized errors in transcription may be present.

## 2018-07-23 NOTE — PROGRESS NOTES
Rakesh Moreno is a 33 y.o. female new patient of Dr. Tommie Vera in today for cervical spine pain s/p fusion. Learning assessment previously completed 4/13/2018; primary language is [place].

## 2019-08-08 ENCOUNTER — HOSPITAL ENCOUNTER (OUTPATIENT)
Dept: GENERAL RADIOLOGY | Age: 29
Discharge: HOME | End: 2019-08-08
Payer: MEDICAID

## 2019-08-08 DIAGNOSIS — R19.01 ABDOMINAL MASS, RIGHT UPPER QUADRANT: ICD-10-CM

## 2019-08-08 DIAGNOSIS — R07.89 CHEST WALL PAIN: ICD-10-CM

## 2019-08-08 PROCEDURE — 71046 X-RAY EXAM CHEST 2 VIEWS: CPT

## 2019-08-08 PROCEDURE — 74019 RADEX ABDOMEN 2 VIEWS: CPT

## 2019-08-27 ENCOUNTER — APPOINTMENT (OUTPATIENT)
Dept: GENERAL RADIOLOGY | Age: 29
End: 2019-08-27
Attending: EMERGENCY MEDICINE
Payer: MEDICAID

## 2019-08-27 ENCOUNTER — HOSPITAL ENCOUNTER (EMERGENCY)
Age: 29
Discharge: HOME | End: 2019-08-27
Attending: EMERGENCY MEDICINE
Payer: MEDICAID

## 2019-08-27 VITALS
HEIGHT: 63 IN | WEIGHT: 232 LBS | RESPIRATION RATE: 18 BRPM | HEART RATE: 90 BPM | SYSTOLIC BLOOD PRESSURE: 154 MMHG | BODY MASS INDEX: 41.11 KG/M2 | TEMPERATURE: 98.4 F | DIASTOLIC BLOOD PRESSURE: 97 MMHG | OXYGEN SATURATION: 98 %

## 2019-08-27 DIAGNOSIS — R07.9 CHEST PAIN, UNSPECIFIED TYPE: Primary | ICD-10-CM

## 2019-08-27 DIAGNOSIS — M54.6 ACUTE RIGHT-SIDED THORACIC BACK PAIN: ICD-10-CM

## 2019-08-27 LAB
ALBUMIN SERPL-MCNC: 3.6 G/DL (ref 3.4–5)
ALBUMIN/GLOB SERPL: 0.9 {RATIO} (ref 0.8–1.7)
ALP SERPL-CCNC: 78 U/L (ref 45–117)
ALT SERPL-CCNC: 38 U/L (ref 13–56)
ANION GAP SERPL CALC-SCNC: 10 MMOL/L (ref 3–18)
AST SERPL-CCNC: 15 U/L (ref 10–38)
BASOPHILS # BLD: 0 K/UL (ref 0–0.1)
BASOPHILS NFR BLD: 0 % (ref 0–2)
BILIRUB SERPL-MCNC: 0.2 MG/DL (ref 0.2–1)
BUN SERPL-MCNC: 10 MG/DL (ref 7–18)
BUN/CREAT SERPL: 15 (ref 12–20)
CALCIUM SERPL-MCNC: 9 MG/DL (ref 8.5–10.1)
CHLORIDE SERPL-SCNC: 104 MMOL/L (ref 100–111)
CK MB CFR SERPL CALC: NORMAL % (ref 0–4)
CK MB SERPL-MCNC: <1 NG/ML (ref 5–25)
CK SERPL-CCNC: 62 U/L (ref 26–192)
CO2 SERPL-SCNC: 28 MMOL/L (ref 21–32)
CREAT SERPL-MCNC: 0.68 MG/DL (ref 0.6–1.3)
DIFFERENTIAL METHOD BLD: NORMAL
EOSINOPHIL # BLD: 0.1 K/UL (ref 0–0.4)
EOSINOPHIL NFR BLD: 2 % (ref 0–5)
ERYTHROCYTE [DISTWIDTH] IN BLOOD BY AUTOMATED COUNT: 12.8 % (ref 11.6–14.5)
GLOBULIN SER CALC-MCNC: 4 G/DL (ref 2–4)
GLUCOSE SERPL-MCNC: 240 MG/DL (ref 74–99)
HCT VFR BLD AUTO: 40.9 % (ref 35–45)
HGB BLD-MCNC: 13.6 G/DL (ref 12–16)
LYMPHOCYTES # BLD: 2 K/UL (ref 0.9–3.6)
LYMPHOCYTES NFR BLD: 28 % (ref 21–52)
MCH RBC QN AUTO: 27.9 PG (ref 24–34)
MCHC RBC AUTO-ENTMCNC: 33.3 G/DL (ref 31–37)
MCV RBC AUTO: 83.8 FL (ref 74–97)
MONOCYTES # BLD: 0.5 K/UL (ref 0.05–1.2)
MONOCYTES NFR BLD: 7 % (ref 3–10)
NEUTS SEG # BLD: 4.5 K/UL (ref 1.8–8)
NEUTS SEG NFR BLD: 63 % (ref 40–73)
PLATELET # BLD AUTO: 238 K/UL (ref 135–420)
PMV BLD AUTO: 9.9 FL (ref 9.2–11.8)
POTASSIUM SERPL-SCNC: 3.5 MMOL/L (ref 3.5–5.5)
PROT SERPL-MCNC: 7.6 G/DL (ref 6.4–8.2)
RBC # BLD AUTO: 4.88 M/UL (ref 4.2–5.3)
SODIUM SERPL-SCNC: 142 MMOL/L (ref 136–145)
TROPONIN I SERPL-MCNC: <0.02 NG/ML (ref 0–0.04)
WBC # BLD AUTO: 7.1 K/UL (ref 4.6–13.2)

## 2019-08-27 PROCEDURE — 80053 COMPREHEN METABOLIC PANEL: CPT

## 2019-08-27 PROCEDURE — 93005 ELECTROCARDIOGRAM TRACING: CPT

## 2019-08-27 PROCEDURE — 82550 ASSAY OF CK (CPK): CPT

## 2019-08-27 PROCEDURE — 99283 EMERGENCY DEPT VISIT LOW MDM: CPT

## 2019-08-27 PROCEDURE — 85025 COMPLETE CBC W/AUTO DIFF WBC: CPT

## 2019-08-27 PROCEDURE — 71046 X-RAY EXAM CHEST 2 VIEWS: CPT

## 2019-08-27 RX ORDER — NAPROXEN 500 MG/1
500 TABLET ORAL 2 TIMES DAILY WITH MEALS
Qty: 20 TAB | Refills: 0 | Status: SHIPPED | OUTPATIENT
Start: 2019-08-27 | End: 2021-03-12

## 2019-08-27 NOTE — DISCHARGE INSTRUCTIONS
Patient Education        Back Pain: Care Instructions  Your Care Instructions    Back pain has many possible causes. It is often related to problems with muscles and ligaments of the back. It may also be related to problems with the nerves, discs, or bones of the back. Moving, lifting, standing, sitting, or sleeping in an awkward way can strain the back. Sometimes you don't notice the injury until later. Arthritis is another common cause of back pain. Although it may hurt a lot, back pain usually improves on its own within several weeks. Most people recover in 12 weeks or less. Using good home treatment and being careful not to stress your back can help you feel better sooner. Follow-up care is a key part of your treatment and safety. Be sure to make and go to all appointments, and call your doctor if you are having problems. It's also a good idea to know your test results and keep a list of the medicines you take. How can you care for yourself at home? · Sit or lie in positions that are most comfortable and reduce your pain. Try one of these positions when you lie down:  ? Lie on your back with your knees bent and supported by large pillows. ? Lie on the floor with your legs on the seat of a sofa or chair. ? Lie on your side with your knees and hips bent and a pillow between your legs. ? Lie on your stomach if it does not make pain worse. · Do not sit up in bed, and avoid soft couches and twisted positions. Bed rest can help relieve pain at first, but it delays healing. Avoid bed rest after the first day of back pain. · Change positions every 30 minutes. If you must sit for long periods of time, take breaks from sitting. Get up and walk around, or lie in a comfortable position. · Try using a heating pad on a low or medium setting for 15 to 20 minutes every 2 or 3 hours. Try a warm shower in place of one session with the heating pad. · You can also try an ice pack for 10 to 15 minutes every 2 to 3 hours. Put a thin cloth between the ice pack and your skin. · Take pain medicines exactly as directed. ? If the doctor gave you a prescription medicine for pain, take it as prescribed. ? If you are not taking a prescription pain medicine, ask your doctor if you can take an over-the-counter medicine. · Take short walks several times a day. You can start with 5 to 10 minutes, 3 or 4 times a day, and work up to longer walks. Walk on level surfaces and avoid hills and stairs until your back is better. · Return to work and other activities as soon as you can. Continued rest without activity is usually not good for your back. · To prevent future back pain, do exercises to stretch and strengthen your back and stomach. Learn how to use good posture, safe lifting techniques, and proper body mechanics. When should you call for help? Call your doctor now or seek immediate medical care if:    · You have new or worsening numbness in your legs.     · You have new or worsening weakness in your legs. (This could make it hard to stand up.)     · You lose control of your bladder or bowels.    Watch closely for changes in your health, and be sure to contact your doctor if:    · You have a fever, lose weight, or don't feel well.     · You do not get better as expected. Where can you learn more? Go to http://ivan-tenzin.info/. Enter H536 in the search box to learn more about \"Back Pain: Care Instructions. \"  Current as of: September 20, 2018  Content Version: 12.1  © 5100-5602 [employer] Inc[employer]. Care instructions adapted under license by "[employer] (which disclaims liability or warranty for this information). If you have questions about a medical condition or this instruction, always ask your healthcare professional. [place] [address] any warranty or liability for your use of this information.          Patient Education        Chest Pain: Care Instructions  Your Care Instructions    There are many things that can cause chest pain. Some are not serious and will get better on their own in a few days. But some kinds of chest pain need more testing and treatment. Your doctor may have recommended a follow-up visit in the next 8 to 12 hours. If you are not getting better, you may need more tests or treatment. Even though your doctor has released you, you still need to watch for any problems. The doctor carefully checked you, but sometimes problems can develop later. If you have new symptoms or if your symptoms do not get better, get medical care right away. If you have worse or different chest pain or pressure that lasts more than 5 minutes or you passed out (lost consciousness), call 911 or seek other emergency help right away. A medical visit is only one step in your treatment. Even if you feel better, you still need to do what your doctor recommends, such as going to all suggested follow-up appointments and taking medicines exactly as directed. This will help you recover and help prevent future problems. How can you care for yourself at home? · Rest until you feel better. · Take your medicine exactly as prescribed. Call your doctor if you think you are having a problem with your medicine. · Do not drive after taking a prescription pain medicine. When should you call for help? Call 911 if:    · You passed out (lost consciousness).     · You have severe difficulty breathing.     · You have symptoms of a heart attack. These may include:  ? Chest pain or pressure, or a strange feeling in your chest.  ? Sweating. ? Shortness of breath. ? Nausea or vomiting. ? Pain, pressure, or a strange feeling in your back, neck, jaw, or upper belly or in one or both shoulders or arms. ? Lightheadedness or sudden weakness. ? A fast or irregular heartbeat. After you call 911, the  may tell you to chew 1 adult-strength or 2 to 4 low-dose aspirin. Wait for an ambulance.  Do not try to drive yourself.    Call your doctor today if:    · You have any trouble breathing.     · Your chest pain gets worse.     · You are dizzy or lightheaded, or you feel like you may faint.     · You are not getting better as expected.     · You are having new or different chest pain. Where can you learn more? Go to http://ivan-tenzin.info/. Enter A120 in the search box to learn more about \"Chest Pain: Care Instructions. \"  Current as of: September 23, 2018  Content Version: 12.1  © 4139-3667 [employer]. Care instructions adapted under license by [employer] software (which disclaims liability or warranty for this information). If you have questions about a medical condition or this instruction, always ask your healthcare professional. [address] any warranty or liability for your use of this information.

## 2019-08-27 NOTE — ED PROVIDER NOTES
EMERGENCY DEPARTMENT HISTORY AND PHYSICAL EXAM    5:09 PM      Date: 8/27/2019  Patient Name: Omar Guzman    History of Presenting Illness     Chief Complaint   Patient presents with    Chest Pain         History Provided By: Patient    Additional History (Context): Omar Guzman is a 26 y.o. female with diabetes and hypertension who presents with chest and back pain. Patient states she was washing her hair yesterday and developed a sharp pain in her back moving towards her right side of her chest.  Sushma Orozco it was sharp. Lasting 3 minutes. Today she has had to additional episodes. She denies any nausea, vomiting or diarrhea. Patient has developed some shortness of breath with the pain. Patient denies a smoking, alcohol or recreational drug use. Dg Maldonado PCP: Ava Barnett NP      Current Outpatient Medications   Medication Sig Dispense Refill    naproxen (NAPROSYN) 500 mg tablet Take 1 Tab by mouth two (2) times daily (with meals). 20 Tab 0    metFORMIN (GLUCOPHAGE) 500 mg tablet Take  by mouth two (2) times daily (with meals).  spironolactone (ALDACTONE) 25 mg tablet Take 1 Tab by mouth daily. Indications: hypertension 30 Tab 0    carvedilol (COREG) 25 mg tablet Take 1 Tab by mouth two (2) times daily (with meals). Indications: hypertension 60 Tab 0    indapamide (LOZOL) 2.5 mg tablet Take 1 Tab by mouth daily. Indications: hypertension 30 Tab 0       Past History     Past Medical History:  Past Medical History:   Diagnosis Date    Dental caries     Diabetes ([address]     Gastrointestinal disorder     Hernia removed    Hypertension     Ill-defined condition        Past Surgical History:  Past Surgical History:   Procedure Laterality Date    HX GYN      4 sections    HX OTHER SURGICAL      tonsils and addenoids removed    NEUROLOGICAL PROCEDURE UNLISTED      spinal cord surgery 5/2018       Family History:  History reviewed. No pertinent family history.     Social History:  Social History     Tobacco Use    Smoking status: Former Smoker     Packs/day: 0.50     Last attempt to quit: 2018     Years since quittin.3    Smokeless tobacco: Never Used   Substance Use Topics    Alcohol use: No    Drug use: No       Allergies: Allergies   Allergen Reactions    Levaquin [Levofloxacin] Rash    Penicillins Hives and Rash         Review of Systems       Review of Systems   Constitutional: Negative. Negative for chills, diaphoresis and fever. HENT: Negative. Negative for congestion, rhinorrhea and sore throat. Eyes: Negative. Negative for pain, discharge and redness. Respiratory: Negative. Negative for cough, chest tightness, shortness of breath and wheezing. Cardiovascular: Negative. Negative for chest pain. Gastrointestinal: Negative. Negative for abdominal pain, constipation, diarrhea, nausea and vomiting. Genitourinary: Negative. Negative for dysuria, flank pain, frequency, hematuria and urgency. Musculoskeletal: Negative. Negative for back pain and neck pain. Skin: Negative. Negative for rash. Neurological: Negative. Negative for syncope, weakness, numbness and headaches. Psychiatric/Behavioral: Negative. All other systems reviewed and are negative. Physical Exam     Visit Vitals  BP (!) 154/97   Pulse 90   Temp 98.4 °F (36.9 °C)   Resp 18   Ht 5' 3\" (1.6 m)   Wt 105.2 kg (232 lb)   LMP 2019   SpO2 98%   BMI 41.10 kg/m²         Physical Exam   Constitutional: She appears well-developed and well-nourished. Non-toxic appearance. She does not have a sickly appearance. She does not appear ill. No distress. HENT:   Head: Normocephalic and atraumatic. Mouth/Throat: Oropharynx is clear and moist. No oropharyngeal exudate. Eyes: Pupils are equal, round, and reactive to light. Conjunctivae and EOM are normal. No scleral icterus. Neck: Normal range of motion. Neck supple. No hepatojugular reflux and no JVD present. No tracheal deviation present.  No thyromegaly present. Cardiovascular: Normal rate, regular rhythm, S1 normal, S2 normal, normal heart sounds, intact distal pulses and normal pulses. Exam reveals no gallop, no S3 and no S4. No murmur heard. Pulses:       Radial pulses are 2+ on the right side, and 2+ on the left side. Dorsalis pedis pulses are 2+ on the right side, and 2+ on the left side. Pulmonary/Chest: Effort normal and breath sounds normal. No respiratory distress. She has no decreased breath sounds. She has no wheezes. She has no rhonchi. She has no rales. Abdominal: Soft. Normal appearance and bowel sounds are normal. She exhibits no distension and no mass. There is no hepatosplenomegaly. There is no tenderness. There is no rigidity, no rebound, no guarding, no CVA tenderness, no tenderness at McBurney's point and negative Mike's sign. Musculoskeletal: Normal range of motion. She exhibits no edema or tenderness. Back:    Strength is 5 out of 5 throughout. Lymphadenopathy:        Head (right side): No submental, no submandibular, no preauricular and no occipital adenopathy present. Head (left side): No submental, no submandibular, no preauricular and no occipital adenopathy present. She has no cervical adenopathy. Right: No supraclavicular adenopathy present. Left: No supraclavicular adenopathy present. Neurological: She is alert. She has normal strength and normal reflexes. She is not disoriented. No cranial nerve deficit or sensory deficit. Coordination and gait normal. GCS eye subscore is 4. GCS verbal subscore is 5. GCS motor subscore is 6. Grossly intact. Skin: Skin is warm, dry and intact. No rash noted. She is not diaphoretic. Psychiatric: She has a normal mood and affect. Her speech is normal and behavior is normal. Judgment and thought content normal. Cognition and memory are normal.   Nursing note and vitals reviewed.         Diagnostic Study Results     Labs -  Recent Results (from the past 12 hour(s))   EKG, 12 LEAD, INITIAL    Collection Time: 08/27/19  5:02 PM   Result Value Ref Range    Ventricular Rate 108 BPM    Atrial Rate 108 BPM    P-R Interval 156 ms    QRS Duration 94 ms    Q-T Interval 356 ms    QTC Calculation (Bezet) 477 ms    Calculated P Axis 14 degrees    Calculated R Axis 2 degrees    Calculated T Axis 59 degrees    Diagnosis       Sinus tachycardia  Possible Left atrial enlargement  Nonspecific ST abnormality  Abnormal ECG  No previous ECGs available     CBC WITH AUTOMATED DIFF    Collection Time: 08/27/19  5:05 PM   Result Value Ref Range    WBC 7.1 4.6 - 13.2 K/uL    RBC 4.88 4.20 - 5.30 M/uL    HGB 13.6 12.0 - 16.0 g/dL    HCT 40.9 35.0 - 45.0 %    MCV 83.8 74.0 - 97.0 FL    MCH 27.9 24.0 - 34.0 PG    MCHC 33.3 31.0 - 37.0 g/dL    RDW 12.8 11.6 - 14.5 %    PLATELET 698 391 - 824 K/uL    MPV 9.9 9.2 - 11.8 FL    NEUTROPHILS 63 40 - 73 %    LYMPHOCYTES 28 21 - 52 %    MONOCYTES 7 3 - 10 %    EOSINOPHILS 2 0 - 5 %    BASOPHILS 0 0 - 2 %    ABS. NEUTROPHILS 4.5 1.8 - 8.0 K/UL    ABS. LYMPHOCYTES 2.0 0.9 - 3.6 K/UL    ABS. MONOCYTES 0.5 0.05 - 1.2 K/UL    ABS. EOSINOPHILS 0.1 0.0 - 0.4 K/UL    ABS. BASOPHILS 0.0 0.0 - 0.1 K/UL    DF AUTOMATED     METABOLIC PANEL, COMPREHENSIVE    Collection Time: 08/27/19  5:05 PM   Result Value Ref Range    Sodium 142 136 - 145 mmol/L    Potassium 3.5 3.5 - 5.5 mmol/L    Chloride 104 100 - 111 mmol/L    CO2 28 21 - 32 mmol/L    Anion gap 10 3.0 - 18 mmol/L    Glucose 240 (H) 74 - 99 mg/dL    BUN 10 7.0 - 18 MG/DL    Creatinine 0.68 0.6 - 1.3 MG/DL    BUN/Creatinine ratio 15 12 - 20      GFR est AA >60 >60 ml/min/1.73m2    GFR est non-AA >60 >60 ml/min/1.73m2    Calcium 9.0 8.5 - 10.1 MG/DL    Bilirubin, total 0.2 0.2 - 1.0 MG/DL    ALT (SGPT) 38 13 - 56 U/L    AST (SGOT) 15 10 - 38 U/L    Alk.  phosphatase 78 45 - 117 U/L    Protein, total 7.6 6.4 - 8.2 g/dL    Albumin 3.6 3.4 - 5.0 g/dL    Globulin 4.0 2.0 - 4.0 g/dL    A-G Ratio 0.9 0.8 - 1.7     CARDIAC PANEL,(CK, CKMB & TROPONIN)    Collection Time: 08/27/19  5:05 PM   Result Value Ref Range    CK 62 26 - 192 U/L    CK - MB <1.0 <3.6 ng/ml    CK-MB Index  0.0 - 4.0 %     CALCULATION NOT PERFORMED WHEN RESULT IS BELOW LINEAR LIMIT    Troponin-I, QT <0.02 0.0 - 0.045 NG/ML       Radiologic Studies -   XR CHEST PA LAT    (Results Pending)         Medical Decision Making   Provider Notes (Medical Decision Making):  MDM  Number of Diagnoses or Management Options  Diagnosis management comments: DIFFERENTIAL DIAGNOSES/ MEDICAL DECISION MAKING:  Chest pain etiologies include acute cardiac events to include possible acute myocardial infarction, acute coronary syndrome, pneumonia, chest wall pain (myofascial/ musculoskeletal etiology), chronic obstructive pulmonary disease (copd), acute asthma exacerbation, congestive heart failure, acute bronchitis, pulmonary embolism, upper respiratory infection, referred abdominal pain, other etiologies, versus combination of the above. I am the first provider for this patient. I reviewed the vital signs, available nursing notes, past medical history, past surgical history, family history and social history. Vital Signs-Reviewed the patient's vital signs. Records Reviewed: Nursing Notes (Time of Review: 5:09 PM)    ED Course: Progress Notes, Reevaluation, and Consults:    Labs essentially normal.  Chest X-Ray showed No acute process. EKG showed sinus tachycardia at a rate of 108 bpm. With no ST elevations or depression and non specific T wave changes. 6:49 PM 8/27/2019        Diagnosis       I have reassessed the patient. Patient is feeling better. Patient will be prescribed Naprosyn. Patient was discharged in stable condition. Patient is to return to emergency department if any new or worsening condition. Clinical Impression:   1. Chest pain, unspecified type    2.  Acute right-sided thoracic back pain        Disposition: Discharged home Follow-up Information     Follow up With Specialties Details Why Contact Info    Cristopher Banda NP Nurse Practitioner In 2 days  [address]  [address] [place]  [address]  [phone]               [address]        Provider Attestation:     I personally performed the services described in the documentation, reviewed the documentation and it accurately and completely records my words and actions utilizing the [address] [place] August 27, 2019 at 6:43 PM - Kathi Sanchez DO    Disclaimer. It is dictated using utilizing voice recognition software. Unfortunately this leads to occasional typographical errors. I apologize in advance if the situation occurs. If questions arise please do not hesitate to contact me or call our department.

## 2019-08-28 LAB
ATRIAL RATE: 108 BPM
CALCULATED P AXIS, ECG09: 14 DEGREES
CALCULATED R AXIS, ECG10: 2 DEGREES
CALCULATED T AXIS, ECG11: 59 DEGREES
DIAGNOSIS, 93000: NORMAL
P-R INTERVAL, ECG05: 156 MS
Q-T INTERVAL, ECG07: 356 MS
QRS DURATION, ECG06: 94 MS
QTC CALCULATION (BEZET), ECG08: 477 MS
VENTRICULAR RATE, ECG03: 108 BPM

## 2020-03-23 ENCOUNTER — APPOINTMENT (OUTPATIENT)
Dept: CT IMAGING | Age: 30
End: 2020-03-23
Attending: EMERGENCY MEDICINE
Payer: MEDICAID

## 2020-03-23 ENCOUNTER — HOSPITAL ENCOUNTER (EMERGENCY)
Age: 30
Discharge: HOME | End: 2020-03-24
Attending: EMERGENCY MEDICINE
Payer: MEDICAID

## 2020-03-23 DIAGNOSIS — N39.0 URINARY TRACT INFECTION WITHOUT HEMATURIA, SITE UNSPECIFIED: ICD-10-CM

## 2020-03-23 DIAGNOSIS — R19.7 DIARRHEA, UNSPECIFIED TYPE: ICD-10-CM

## 2020-03-23 DIAGNOSIS — I10 HYPERTENSION, UNSPECIFIED TYPE: ICD-10-CM

## 2020-03-23 DIAGNOSIS — N83.202 CYST OF LEFT OVARY: ICD-10-CM

## 2020-03-23 DIAGNOSIS — E83.42 HYPOMAGNESEMIA: ICD-10-CM

## 2020-03-23 DIAGNOSIS — R10.84 ABDOMINAL PAIN, GENERALIZED: Primary | ICD-10-CM

## 2020-03-23 DIAGNOSIS — R73.9 HYPERGLYCEMIA: ICD-10-CM

## 2020-03-23 LAB
ALBUMIN SERPL-MCNC: 3.5 G/DL (ref 3.4–5)
ALBUMIN/GLOB SERPL: 1 {RATIO} (ref 0.8–1.7)
ALP SERPL-CCNC: 58 U/L (ref 45–117)
ALT SERPL-CCNC: 63 U/L (ref 13–56)
ANION GAP SERPL CALC-SCNC: 9 MMOL/L (ref 3–18)
AST SERPL-CCNC: 31 U/L (ref 10–38)
BASOPHILS # BLD: 0 K/UL (ref 0–0.1)
BASOPHILS NFR BLD: 0 % (ref 0–2)
BILIRUB SERPL-MCNC: 0.3 MG/DL (ref 0.2–1)
BUN SERPL-MCNC: 13 MG/DL (ref 7–18)
BUN/CREAT SERPL: 16 (ref 12–20)
CALCIUM SERPL-MCNC: 9.6 MG/DL (ref 8.5–10.1)
CHLORIDE SERPL-SCNC: 104 MMOL/L (ref 100–111)
CO2 SERPL-SCNC: 26 MMOL/L (ref 21–32)
CREAT SERPL-MCNC: 0.8 MG/DL (ref 0.6–1.3)
DIFFERENTIAL METHOD BLD: NORMAL
EOSINOPHIL # BLD: 0.1 K/UL (ref 0–0.4)
EOSINOPHIL NFR BLD: 2 % (ref 0–5)
ERYTHROCYTE [DISTWIDTH] IN BLOOD BY AUTOMATED COUNT: 12.4 % (ref 11.6–14.5)
GLOBULIN SER CALC-MCNC: 3.6 G/DL (ref 2–4)
GLUCOSE SERPL-MCNC: 252 MG/DL (ref 74–99)
HCG SERPL QL: NEGATIVE
HCT VFR BLD AUTO: 39 % (ref 35–45)
HGB BLD-MCNC: 13.1 G/DL (ref 12–16)
LYMPHOCYTES # BLD: 2 K/UL (ref 0.9–3.6)
LYMPHOCYTES NFR BLD: 29 % (ref 21–52)
MAGNESIUM SERPL-MCNC: 1.4 MG/DL (ref 1.6–2.6)
MCH RBC QN AUTO: 28.1 PG (ref 24–34)
MCHC RBC AUTO-ENTMCNC: 33.6 G/DL (ref 31–37)
MCV RBC AUTO: 83.5 FL (ref 74–97)
MONOCYTES # BLD: 0.4 K/UL (ref 0.05–1.2)
MONOCYTES NFR BLD: 6 % (ref 3–10)
NEUTS SEG # BLD: 4.3 K/UL (ref 1.8–8)
NEUTS SEG NFR BLD: 63 % (ref 40–73)
PLATELET # BLD AUTO: 246 K/UL (ref 135–420)
PMV BLD AUTO: 10.3 FL (ref 9.2–11.8)
POTASSIUM SERPL-SCNC: 3.5 MMOL/L (ref 3.5–5.5)
PROT SERPL-MCNC: 7.1 G/DL (ref 6.4–8.2)
RBC # BLD AUTO: 4.67 M/UL (ref 4.2–5.3)
SODIUM SERPL-SCNC: 139 MMOL/L (ref 136–145)
WBC # BLD AUTO: 6.8 K/UL (ref 4.6–13.2)

## 2020-03-23 PROCEDURE — 74177 CT ABD & PELVIS W/CONTRAST: CPT

## 2020-03-23 PROCEDURE — 83735 ASSAY OF MAGNESIUM: CPT

## 2020-03-23 PROCEDURE — 96361 HYDRATE IV INFUSION ADD-ON: CPT

## 2020-03-23 PROCEDURE — 96365 THER/PROPH/DIAG IV INF INIT: CPT

## 2020-03-23 PROCEDURE — 99285 EMERGENCY DEPT VISIT HI MDM: CPT

## 2020-03-23 PROCEDURE — 74011250636 HC RX REV CODE- 250/636: Performed by: EMERGENCY MEDICINE

## 2020-03-23 PROCEDURE — 84703 CHORIONIC GONADOTROPIN ASSAY: CPT

## 2020-03-23 PROCEDURE — 96375 TX/PRO/DX INJ NEW DRUG ADDON: CPT

## 2020-03-23 PROCEDURE — 80053 COMPREHEN METABOLIC PANEL: CPT

## 2020-03-23 PROCEDURE — 74011636320 HC RX REV CODE- 636/320: Performed by: EMERGENCY MEDICINE

## 2020-03-23 PROCEDURE — 81001 URINALYSIS AUTO W/SCOPE: CPT

## 2020-03-23 PROCEDURE — 74011250637 HC RX REV CODE- 250/637: Performed by: EMERGENCY MEDICINE

## 2020-03-23 PROCEDURE — 85025 COMPLETE CBC W/AUTO DIFF WBC: CPT

## 2020-03-23 PROCEDURE — 74011250636 HC RX REV CODE- 250/636: Performed by: PHYSICIAN ASSISTANT

## 2020-03-23 RX ORDER — MAGNESIUM SULFATE 1 G/100ML
1 INJECTION INTRAVENOUS
Status: COMPLETED | OUTPATIENT
Start: 2020-03-23 | End: 2020-03-24

## 2020-03-23 RX ORDER — CALCIUM CARBONATE 300MG(750)
1 TABLET,CHEWABLE ORAL DAILY
Qty: 3 TAB | Refills: 0 | Status: SHIPPED | OUTPATIENT
Start: 2020-03-23 | End: 2020-03-26

## 2020-03-23 RX ORDER — CARVEDILOL 6.25 MG/1
25 TABLET ORAL
Status: COMPLETED | OUTPATIENT
Start: 2020-03-23 | End: 2020-03-23

## 2020-03-23 RX ORDER — FAMOTIDINE 20 MG/1
20 TABLET, FILM COATED ORAL DAILY
Qty: 10 TAB | Refills: 0 | Status: SHIPPED | OUTPATIENT
Start: 2020-03-23 | End: 2020-04-02

## 2020-03-23 RX ORDER — ONDANSETRON 4 MG/1
4 TABLET, ORALLY DISINTEGRATING ORAL
Qty: 14 TAB | Refills: 0 | Status: SHIPPED | OUTPATIENT
Start: 2020-03-23 | End: 2021-03-12

## 2020-03-23 RX ORDER — BUPROPION HYDROCHLORIDE 150 MG/1
150 TABLET ORAL
COMMUNITY

## 2020-03-23 RX ORDER — ONDANSETRON 2 MG/ML
4 INJECTION INTRAMUSCULAR; INTRAVENOUS
Status: COMPLETED | OUTPATIENT
Start: 2020-03-23 | End: 2020-03-23

## 2020-03-23 RX ORDER — KETOROLAC TROMETHAMINE 10 MG/1
10 TABLET, FILM COATED ORAL
Qty: 14 TAB | Refills: 0 | Status: SHIPPED | OUTPATIENT
Start: 2020-03-23 | End: 2021-03-12

## 2020-03-23 RX ORDER — CLINDAMYCIN HYDROCHLORIDE 300 MG/1
300 CAPSULE ORAL 3 TIMES DAILY
COMMUNITY
End: 2021-07-21

## 2020-03-23 RX ORDER — MORPHINE SULFATE 4 MG/ML
4 INJECTION, SOLUTION INTRAMUSCULAR; INTRAVENOUS
Status: COMPLETED | OUTPATIENT
Start: 2020-03-23 | End: 2020-03-23

## 2020-03-23 RX ADMIN — CARVEDILOL 25 MG: 6.25 TABLET, FILM COATED ORAL at 21:48

## 2020-03-23 RX ADMIN — MAGNESIUM SULFATE 1 G: 1 INJECTION INTRAVENOUS at 23:50

## 2020-03-23 RX ADMIN — SODIUM CHLORIDE 1000 ML: 900 INJECTION, SOLUTION INTRAVENOUS at 22:00

## 2020-03-23 RX ADMIN — MORPHINE SULFATE 4 MG: 4 INJECTION, SOLUTION INTRAMUSCULAR; INTRAVENOUS at 21:49

## 2020-03-23 RX ADMIN — IOPAMIDOL 100 ML: 612 INJECTION, SOLUTION INTRAVENOUS at 22:51

## 2020-03-23 RX ADMIN — ONDANSETRON 4 MG: 2 INJECTION INTRAMUSCULAR; INTRAVENOUS at 21:49

## 2020-03-24 VITALS
SYSTOLIC BLOOD PRESSURE: 133 MMHG | HEART RATE: 78 BPM | TEMPERATURE: 98.2 F | OXYGEN SATURATION: 98 % | WEIGHT: 241 LBS | DIASTOLIC BLOOD PRESSURE: 76 MMHG | RESPIRATION RATE: 15 BRPM | HEIGHT: 61 IN | BODY MASS INDEX: 45.5 KG/M2

## 2020-03-24 LAB
APPEARANCE UR: CLEAR
BACTERIA URNS QL MICRO: ABNORMAL /HPF
BILIRUB UR QL: NEGATIVE
COLOR UR: YELLOW
EPITH CASTS URNS QL MICRO: ABNORMAL /LPF (ref 0–5)
GLUCOSE UR STRIP.AUTO-MCNC: 100 MG/DL
HGB UR QL STRIP: NEGATIVE
HYALINE CASTS URNS QL MICRO: ABNORMAL /LPF (ref 0–2)
KETONES UR QL STRIP.AUTO: NEGATIVE MG/DL
LEUKOCYTE ESTERASE UR QL STRIP.AUTO: ABNORMAL
NITRITE UR QL STRIP.AUTO: POSITIVE
PH UR STRIP: 5 [PH] (ref 5–8)
PROT UR STRIP-MCNC: NEGATIVE MG/DL
RBC #/AREA URNS HPF: ABNORMAL /HPF (ref 0–5)
SP GR UR REFRACTOMETRY: >1.03 (ref 1–1.03)
UROBILINOGEN UR QL STRIP.AUTO: 0.2 EU/DL (ref 0.2–1)
WBC CASTS URNS QL MICRO: ABNORMAL /LPF
WBC URNS QL MICRO: ABNORMAL /HPF (ref 0–4)

## 2020-03-24 PROCEDURE — 74011250637 HC RX REV CODE- 250/637: Performed by: EMERGENCY MEDICINE

## 2020-03-24 RX ORDER — SULFAMETHOXAZOLE AND TRIMETHOPRIM 800; 160 MG/1; MG/1
1 TABLET ORAL 2 TIMES DAILY
Qty: 14 TAB | Refills: 0 | Status: SHIPPED | OUTPATIENT
Start: 2020-03-24 | End: 2020-03-31

## 2020-03-24 RX ORDER — SULFAMETHOXAZOLE AND TRIMETHOPRIM 800; 160 MG/1; MG/1
1 TABLET ORAL ONCE
Status: COMPLETED | OUTPATIENT
Start: 2020-03-24 | End: 2020-03-24

## 2020-03-24 RX ADMIN — SULFAMETHOXAZOLE AND TRIMETHOPRIM 1 TABLET: 800; 160 TABLET ORAL at 00:14

## 2020-03-24 NOTE — ED TRIAGE NOTES
Patient presents to ER for C/O lower anterior abdominal pain that began 1 to 2 hours ago. States hx of diarrhea for about 3 days.

## 2020-03-24 NOTE — ED NOTES
NB diarrhea x 2-3 days with assoc b/l lower and suprapubic abd pain that began 1 hour ago. Recently had teeth pulled on 3/17 and treated with clindamycin. I performed a brief evaluation, including history, of the patient here in triage and I have determined that pt will need further treatment and evaluation from the main side ER physician. I have placed initial orders to help in expediting patients care.      March 23, 2020 at 8:59 PM - JOHAN Moralez

## 2020-03-24 NOTE — ED PROVIDER NOTES
Pt c/o b/l low abd pain, x 2-3 hours, severe, constant, sharp. H/o diarrhea x 2 days, watery, 4-5 per day. Mild anusea, no vomiting. No back or chest pain. No vag or pelvic pain. No weakness. No meds taken for pain pta. No cough or fever. No rash. Past Medical History:   Diagnosis Date    Dental caries     Diabetes ([facility] [address]     Gastrointestinal disorder     Hernia removed    Hypertension     Ill-defined condition        Past Surgical History:   Procedure Laterality Date    HX GYN      4 sections    HX OTHER SURGICAL      tonsils and addenoids removed    NEUROLOGICAL PROCEDURE UNLISTED      spinal cord surgery 2018         No family history on file.     Social History     Socioeconomic History    Marital status:      Spouse name: Not on file    Number of children: Not on file    Years of education: Not on file    Highest education level: Not on file   Occupational History    Not on file   Social Needs    Financial resource strain: Not on file    Food insecurity     Worry: Not on file     Inability: Not on file    Transportation needs     Medical: Not on file     Non-medical: Not on file   Tobacco Use    Smoking status: Former Smoker     Packs/day: 0.50     Last attempt to quit: 2018     Years since quittin.8    Smokeless tobacco: Never Used   Substance and Sexual Activity    Alcohol use: No    Drug use: No    Sexual activity: Not on file   Lifestyle    Physical activity     Days per week: Not on file     Minutes per session: Not on file    Stress: Not on file   Relationships    Social connections     Talks on phone: Not on file     Gets together: Not on file     Attends [employer] service: Not on file     Active member of club or organization: Not on file     Attends meetings of clubs or organizations: Not on file     Relationship status: Not on file    Intimate partner violence     Fear of current or ex partner: Not on file     Emotionally abused: Not on file Physically abused: Not on file     Forced sexual activity: Not on file   Other Topics Concern    Not on file   Social History Narrative    Not on file         ALLERGIES: Levaquin [levofloxacin] and Penicillins    Review of Systems   Constitutional: Negative for fever. HENT: Negative for congestion. Respiratory: Negative for cough and shortness of breath. Cardiovascular: Negative for chest pain. Gastrointestinal: Positive for abdominal pain and nausea. Negative for vomiting. Musculoskeletal: Negative for back pain. Skin: Negative for rash. Neurological: Negative for light-headedness. All other systems reviewed and are negative. Vitals:    03/23/20 2208 03/23/20 2230 03/23/20 2300 03/24/20 0030   BP:  147/88 (!) 151/91 133/76   Pulse: 85 80 82 78   Resp: 9 15 19 15   Temp:   98.5 °F (36.9 °C) 98.2 °F (36.8 °C)   SpO2: 92% 95% 96% 98%   Weight:       Height:                Physical Exam  Vitals signs and nursing note reviewed. Constitutional:       Appearance: She is well-developed. She is not diaphoretic. HENT:      Head: Normocephalic and atraumatic. Eyes:      Pupils: Pupils are equal, round, and reactive to light. Neck:      Musculoskeletal: Normal range of motion. Cardiovascular:      Rate and Rhythm: Normal rate and regular rhythm. Heart sounds: No murmur. Pulmonary:      Effort: Pulmonary effort is normal.      Breath sounds: No wheezing. Abdominal:      Palpations: Abdomen is soft. Tenderness: There is abdominal tenderness (b/l mid/low abd ttp). Musculoskeletal:         General: No tenderness. Skin:     General: Skin is dry. Capillary Refill: Capillary refill takes less than 2 seconds. Findings: No rash. Neurological:      Mental Status: She is alert and oriented to person, place, and time. MDM       Procedures      Vitals:  No data found.       Medications ordered:   Medications   sodium chloride 0.9 % bolus infusion 1,000 mL (0 mL IntraVENous IV Completed 3/23/20 2310)   morphine injection 4 mg (4 mg IntraVENous Given 3/23/20 2149)   ondansetron (ZOFRAN) injection 4 mg (4 mg IntraVENous Given 3/23/20 2149)   carvediloL (COREG) tablet 25 mg (25 mg Oral Given 3/23/20 2148)   iopamidoL (ISOVUE 300) 61 % contrast injection  mL (100 mL IntraVENous Given 3/23/20 2251)   magnesium sulfate 1 g/100 ml IVPB (premix or compounded) (0 g IntraVENous IV Completed 3/24/20 0101)   trimethoprim-sulfamethoxazole (BACTRIM DS, SEPTRA DS) 160-800 mg per tablet 1 Tab (1 Tab Oral Given 3/24/20 0014)         Lab findings:  No results found for this or any previous visit (from the past 12 hour(s)). X-Ray, CT or other radiology findings or impressions:  CT ABD PELV W CONT   Final Result   IMPRESSION:       1. No acute finding. 2. Hepatosplenomegaly. Hepatic steatosis. 3. Left ovarian cyst, probably physiologic for patient's age. Consider follow-up   pelvic ultrasound in 6-8 weeks to document resolution          Progress notes, Consult notes or additional Procedure notes:   10:26 PM bp wnl on recheck, abd soft and non-tender no pain or complaints, ct pending. 11:48 PM pt remains pain free, no ttp.  elev gluc but not c/w dka.  htn resolved w tx. Told of all findings inc ov cyst.  Denies pain, declines us or further testing. To dc per pt req. Det ret inst given. No emc found, pt to ret for further testing as recommened. Diagnosis:   1. Abdominal pain, generalized    2. Diarrhea, unspecified type    3. Hypertension, unspecified type    4. Hypomagnesemia    5. Hyperglycemia    6. Cyst of left ovary    7. Urinary tract infection without hematuria, site unspecified        Disposition: home    Follow-up Information    None          Discharge Medication List as of 3/23/2020 11:48 PM      START taking these medications    Details   magnesium oxide 400 mg magnesium tab Take 1 Tab by mouth daily for 3 days. , Print, Disp-3 Tab, R-0      ondansetron (Zofran ODT) 4 mg disintegrating tablet Take 1 Tab by mouth every eight (8) hours as needed for Nausea. , Print, Disp-14 Tab, R-0      ketorolac (TORADOL) 10 mg tablet Take 1 Tab by mouth every eight (8) hours as needed for Pain., Print, Disp-14 Tab, R-0      famotidine (Pepcid) 20 mg tablet Take 1 Tab by mouth daily for 10 days. , Print, Disp-10 Tab, R-0         CONTINUE these medications which have NOT CHANGED    Details   buPROPion XL (Wellbutrin XL) 150 mg tablet Take 150 mg by mouth every morning. Not sure of dose 150mg or 300mg?, Historical Med      clindamycin (CLEOCIN) 300 mg capsule Take 300 mg by mouth three (3) times daily. , Historical Med      metFORMIN (GLUCOPHAGE) 500 mg tablet Take  by mouth two (2) times daily (with meals). , Historical Med      spironolactone (ALDACTONE) 25 mg tablet Take 1 Tab by mouth daily. Indications: hypertension, Print, Disp-30 Tab, R-0      carvedilol (COREG) 25 mg tablet Take 1 Tab by mouth two (2) times daily (with meals). Indications: hypertension, Print, Disp-60 Tab, R-0      indapamide (LOZOL) 2.5 mg tablet Take 1 Tab by mouth daily. Indications: hypertension, Print, Disp-30 Tab, R-0      naproxen (NAPROSYN) 500 mg tablet Take 1 Tab by mouth two (2) times daily (with meals). , Print, Disp-20 Tab, R-0

## 2020-03-24 NOTE — ED NOTES
I have reviewed discharge instructions with the patient. The patient verbalized understanding. Patient armband removed and given to patient to take home. Patient was informed of the privacy risks if armband lost or stolen  Current Discharge Medication List      START taking these medications    Details   trimethoprim-sulfamethoxazole (Bactrim DS) 160-800 mg per tablet Take 1 Tab by mouth two (2) times a day for 7 days. Qty: 14 Tab, Refills: 0      magnesium oxide 400 mg magnesium tab Take 1 Tab by mouth daily for 3 days. Qty: 3 Tab, Refills: 0      ondansetron (Zofran ODT) 4 mg disintegrating tablet Take 1 Tab by mouth every eight (8) hours as needed for Nausea. Qty: 14 Tab, Refills: 0      ketorolac (TORADOL) 10 mg tablet Take 1 Tab by mouth every eight (8) hours as needed for Pain. Qty: 14 Tab, Refills: 0      famotidine (Pepcid) 20 mg tablet Take 1 Tab by mouth daily for 10 days.   Qty: 10 Tab, Refills: 0

## 2020-07-25 ENCOUNTER — TELEPHONE ENCOUNTER (OUTPATIENT)
Dept: URBAN - METROPOLITAN AREA CLINIC 13 | Facility: CLINIC | Age: 30
End: 2020-07-25

## 2020-07-26 ENCOUNTER — TELEPHONE ENCOUNTER (OUTPATIENT)
Dept: URBAN - METROPOLITAN AREA CLINIC 13 | Facility: CLINIC | Age: 30
End: 2020-07-26

## 2020-09-18 ENCOUNTER — OFFICE VISIT (OUTPATIENT)
Dept: NEUROLOGY | Age: 30
End: 2020-09-18

## 2020-09-18 VITALS
SYSTOLIC BLOOD PRESSURE: 134 MMHG | TEMPERATURE: 97 F | OXYGEN SATURATION: 97 % | HEART RATE: 91 BPM | WEIGHT: 224.6 LBS | BODY MASS INDEX: 42.41 KG/M2 | RESPIRATION RATE: 18 BRPM | HEIGHT: 61 IN | DIASTOLIC BLOOD PRESSURE: 82 MMHG

## 2020-09-18 DIAGNOSIS — M48.062 SPINAL STENOSIS OF LUMBAR REGION WITH NEUROGENIC CLAUDICATION: Primary | ICD-10-CM

## 2020-09-18 DIAGNOSIS — G95.9 CERVICAL MYELOPATHY (HCC): ICD-10-CM

## 2020-09-18 NOTE — PROGRESS NOTES
Mark Mcpherson is a 31 y.o. female . presents for Extremity Weakness (bilateral leg)   . A 31years old female patient with past medical history of hypertension, diabetes, Klippel-Feil syndrome, cervical myelopathy (from cervical spinal cord compression) status post ACDF in 2018 here for evaluation of weakness of the lower extremities. In May 2018, patient had an acute onset weakness of the lower extremities and left upper extremity. MRI of the C-spine showed compression of the spinal cord at the C4-5 level; had a spinal fusion surgery (ACDF) in [facility]. Patient at that time had pulmonary edema and was intubated with subsequent tracheostomy. Since that time, she has improved markedly. No weakness of the upper extremities currently. But over the past 5 months, she started to have a clicking sensation over her lower back while exercising or walking for some distances. After exercising on a treadmill for a few minutes, she started to feel numb and weak over the lower extremities bilaterally. Also has some tingling sensation. Weakness is more on the left side. The numbness and weakness gets better after resting for some time. No incontinence. Review of Systems   Constitutional: Positive for weight loss (intentional). Negative for chills and fever. HENT: Negative for hearing loss and tinnitus. Eyes: Negative for blurred vision and double vision. Respiratory: Negative for cough and shortness of breath. Cardiovascular: Negative for leg swelling. Gastrointestinal: Negative for nausea and vomiting. Genitourinary: Negative for dysuria, frequency and urgency. Musculoskeletal: Positive for back pain and neck pain. Negative for falls. Skin: Negative for itching and rash. Neurological: Positive for tingling and focal weakness (left leg gives out). Negative for dizziness, tremors and headaches.        Past Medical History:   Diagnosis Date    Dental caries     Diabetes ([facility] [address]     Gastrointestinal disorder     Hernia removed    Hypertension     Ill-defined condition        Past Surgical History:   Procedure Laterality Date    HX GYN      4 sections    HX OTHER SURGICAL      tonsils and addenoids removed    NEUROLOGICAL PROCEDURE UNLISTED      spinal cord surgery 2018        No family history on file.      Social History     Socioeconomic History    Marital status:      Spouse name: Not on file    Number of children: Not on file    Years of education: Not on file    Highest education level: Not on file   Occupational History    Not on file   Social Needs    Financial resource strain: Not on file    Food insecurity     Worry: Not on file     Inability: Not on file    Transportation needs     Medical: Not on file     Non-medical: Not on file   Tobacco Use    Smoking status: Former Smoker     Packs/day: 0.50     Last attempt to quit: 2018     Years since quittin.3    Smokeless tobacco: Never Used   Substance and Sexual Activity    Alcohol use: No    Drug use: No    Sexual activity: Not on file   Lifestyle    Physical activity     Days per week: Not on file     Minutes per session: Not on file    Stress: Not on file   Relationships    Social connections     Talks on phone: Not on file     Gets together: Not on file     Attends Christian service: Not on file     Active member of club or organization: Not on file     Attends meetings of clubs or organizations: Not on file     Relationship status: Not on file    Intimate partner violence     Fear of current or ex partner: Not on file     Emotionally abused: Not on file     Physically abused: Not on file     Forced sexual activity: Not on file   Other Topics Concern    Not on file   Social History Narrative    Not on file        Allergies   Allergen Reactions    Levaquin [Levofloxacin] Rash    Penicillins Hives and Rash         Current Outpatient Medications   Medication Sig Dispense Refill    buPROPion XL (Wellbutrin XL) 150 mg tablet Take 150 mg by mouth every morning. Not sure of dose 150mg or 300mg?  metFORMIN (GLUCOPHAGE) 500 mg tablet Take  by mouth two (2) times daily (with meals).  spironolactone (ALDACTONE) 25 mg tablet Take 1 Tab by mouth daily. Indications: hypertension 30 Tab 0    carvedilol (COREG) 25 mg tablet Take 1 Tab by mouth two (2) times daily (with meals). Indications: hypertension 60 Tab 0    indapamide (LOZOL) 2.5 mg tablet Take 1 Tab by mouth daily. Indications: hypertension 30 Tab 0    clindamycin (CLEOCIN) 300 mg capsule Take 300 mg by mouth three (3) times daily.  ondansetron (Zofran ODT) 4 mg disintegrating tablet Take 1 Tab by mouth every eight (8) hours as needed for Nausea. 14 Tab 0    ketorolac (TORADOL) 10 mg tablet Take 1 Tab by mouth every eight (8) hours as needed for Pain. 14 Tab 0    naproxen (NAPROSYN) 500 mg tablet Take 1 Tab by mouth two (2) times daily (with meals). 20 Tab 0         Physical Exam  Constitutional:       Appearance: Normal appearance. HENT:      Head: Normocephalic and atraumatic. Mouth/Throat:      Mouth: Mucous membranes are moist.      Pharynx: Oropharynx is clear. No oropharyngeal exudate. Eyes:      Extraocular Movements: Extraocular movements intact. Neck:      Musculoskeletal: Neck rigidity (limited range of neck movement) present. Pulmonary:      Effort: Pulmonary effort is normal. No respiratory distress. Musculoskeletal:      Right lower leg: No edema. Left lower leg: No edema. Neurological:      Mental Status: She is alert. Comments: Mental status: Awake, alert, oriented x3, follows simple and complex commands.   Speech and languge: fluent, coherent, and comprehension intact  CN: VFF, EOMI, PERRLA, face sensation intact , no facial asymmetry noted, palate elevation symmetric bilat, SS+SCM 5/5 bilat, tongue midline  Motor: no pronator drift, tone slightly increased over the lower extremities; strength is over the upper extremities is 5/5; right hip flexion 4+ and left hip flexion 4; knee flexion and extension 5/5. Sensory: intact to light touch and pinprick throughout  Coordination: FNF, HS accurate w/o dysmetria. DTR: 3+ all over; positive Cecil. Gait: Normal.            No visits with results within 3 Month(s) from this visit. Latest known visit with results is:   Admission on 03/23/2020, Discharged on 03/24/2020   Component Date Value Ref Range Status    WBC 03/23/2020 6.8  4.6 - 13.2 K/uL Final    RBC 03/23/2020 4.67  4.20 - 5.30 M/uL Final    HGB 03/23/2020 13.1  12.0 - 16.0 g/dL Final    HCT 03/23/2020 39.0  35.0 - 45.0 % Final    MCV 03/23/2020 83.5  74.0 - 97.0 FL Final    MCH 03/23/2020 28.1  24.0 - 34.0 PG Final    MCHC 03/23/2020 33.6  31.0 - 37.0 g/dL Final    RDW 03/23/2020 12.4  11.6 - 14.5 % Final    PLATELET 94/33/5316 735  135 - 420 K/uL Final    MPV 03/23/2020 10.3  9.2 - 11.8 FL Final    NEUTROPHILS 03/23/2020 63  40 - 73 % Final    LYMPHOCYTES 03/23/2020 29  21 - 52 % Final    MONOCYTES 03/23/2020 6  3 - 10 % Final    EOSINOPHILS 03/23/2020 2  0 - 5 % Final    BASOPHILS 03/23/2020 0  0 - 2 % Final    ABS. NEUTROPHILS 03/23/2020 4.3  1.8 - 8.0 K/UL Final    ABS. LYMPHOCYTES 03/23/2020 2.0  0.9 - 3.6 K/UL Final    ABS. MONOCYTES 03/23/2020 0.4  0.05 - 1.2 K/UL Final    ABS. EOSINOPHILS 03/23/2020 0.1  0.0 - 0.4 K/UL Final    ABS.  BASOPHILS 03/23/2020 0.0  0.0 - 0.1 K/UL Final    DF 03/23/2020 AUTOMATED    Final    Sodium 03/23/2020 139  136 - 145 mmol/L Final    Potassium 03/23/2020 3.5  3.5 - 5.5 mmol/L Final    Chloride 03/23/2020 104  100 - 111 mmol/L Final    CO2 03/23/2020 26  21 - 32 mmol/L Final    Anion gap 03/23/2020 9  3.0 - 18 mmol/L Final    Glucose 03/23/2020 252* 74 - 99 mg/dL Final    BUN 03/23/2020 13  7.0 - 18 MG/DL Final    Creatinine 03/23/2020 0.80  0.6 - 1.3 MG/DL Final    BUN/Creatinine ratio 03/23/2020 16  12 - 20   Final    GFR est AA 03/23/2020 >60  >60 ml/min/1.73m2 Final    GFR est non-AA 03/23/2020 >60  >60 ml/min/1.73m2 Final    Comment: (NOTE)  Estimated GFR is calculated using the Modification of Diet in Renal   Disease (MDRD) Study equation, reported for both  Americans   (GFRAA) and non- Americans (GFRNA), and normalized to 1.73m2   body surface area. The physician must decide which value applies to   the patient. The MDRD study equation should only be used in   individuals age 25 or older. It has not been validated for the   following: pregnant women, patients with serious comorbid conditions,   or on certain medications, or persons with extremes of body size,   muscle mass, or nutritional status.  Calcium 03/23/2020 9.6  8.5 - 10.1 MG/DL Final    Bilirubin, total 03/23/2020 0.3  0.2 - 1.0 MG/DL Final    ALT (SGPT) 03/23/2020 63* 13 - 56 U/L Final    AST (SGOT) 03/23/2020 31  10 - 38 U/L Final    Alk. phosphatase 03/23/2020 58  45 - 117 U/L Final    Protein, total 03/23/2020 7.1  6.4 - 8.2 g/dL Final    Albumin 03/23/2020 3.5  3.4 - 5.0 g/dL Final    Globulin 03/23/2020 3.6  2.0 - 4.0 g/dL Final    A-G Ratio 03/23/2020 1.0  0.8 - 1.7   Final    Color 03/23/2020 YELLOW    Final    Appearance 03/23/2020 CLEAR    Final    Specific gravity 03/23/2020 >1.030* 1.005 - 1.030 Final    pH (UA) 03/23/2020 5.0  5.0 - 8.0   Final    Protein 03/23/2020 NEGATIVE   NEG mg/dL Final    Glucose 03/23/2020 100* NEG mg/dL Final    Ketone 03/23/2020 NEGATIVE   NEG mg/dL Final    Bilirubin 03/23/2020 NEGATIVE   NEG   Final    Blood 03/23/2020 NEGATIVE   NEG   Final    Urobilinogen 03/23/2020 0.2  0.2 - 1.0 EU/dL Final    Nitrites 03/23/2020 POSITIVE* NEG   Final    Leukocyte Esterase 03/23/2020 TRACE* NEG   Final    HCG, Ql. 03/23/2020 NEGATIVE   NEG   Final    Test results should be confirmed using serum quantitative hCG when detection of pregnancy is critical and before performing any critical medical procedure.  Magnesium 03/23/2020 1.4* 1.6 - 2.6 mg/dL Final    WBC 03/23/2020 11 to 20  0 - 4 /hpf Final    RBC 03/23/2020 4 to 10  0 - 5 /hpf Final    Epithelial cells 03/23/2020 3+  0 - 5 /lpf Final    Bacteria 03/23/2020 4+* NEG /hpf Final    Hyaline cast 03/23/2020 0 to 3  0 - 2 /lpf Final    WBC cast 03/23/2020 0 to 3  NEG /lpf Final             ICD-10-CM ICD-9-CM    1. Spinal stenosis of lumbar region with neurogenic claudication  M48.062 724.03 MRI LUMB SPINE WO CONT   2. Cervical myelopathy (HCC)  G95.9 721.1      A 31years old female patient with above medical problems here for evaluation of symptoms of spinal claudication of 5 months duration. Patient had history of cervical myelopathy from spinal cord compression at C4-5 about 2 years ago status post ACDF. She has improved markedly and has been ambulating normally. On exam her reflexes are exaggerated all over with a positive Cecil which is most likely from her old spinal cord lesion. For her possible spinal canal stenosis, I have ordered MRI of the lumbosacral spine. We will call her with results. We will see her in 3 months time.

## 2020-09-18 NOTE — PROGRESS NOTES
Nikki Matamoros is a 31 y.o. female patient in office today to discuss bilateral leg weakness; as referred by Martine Rose MD. Patient has additional c/o \"back clicking\". Last seen in this office by Dr. Rebekah Canchola 7/2018 for cervical stenosis. 1. Have you been to the ER, urgent care clinic since your last visit? Hospitalized since your last visit? Yes Reason for visit: 8/2019 [facility] ED chest pain 3/2020 [facility] ED abdominal pain    2. Have you seen or consulted any other health care providers outside of the [address] [place] [address] since your last visit? Include any pap smears or colon screening.  No

## 2020-10-09 ENCOUNTER — TELEPHONE (OUTPATIENT)
Dept: NEUROLOGY | Age: 30
End: 2020-10-09

## 2020-10-09 NOTE — TELEPHONE ENCOUNTER
Pt states that her MRI order was denied by her insurance. She seeks advice on what she should do alternatively since MRI was denied. Please advise.

## 2020-10-16 ENCOUNTER — TELEPHONE (OUTPATIENT)
Dept: NEUROLOGY | Age: 30
End: 2020-10-16

## 2020-10-16 NOTE — TELEPHONE ENCOUNTER
Patient stated that her insurance cancelled her MRI and central scheduling told her to call us to get the insurance auth. Please advise.

## 2020-10-21 ENCOUNTER — TELEPHONE (OUTPATIENT)
Dept: NEUROLOGY | Age: 30
End: 2020-10-21

## 2020-10-21 NOTE — TELEPHONE ENCOUNTER
Patient called into office and stated that she was told by scheduling that Dr. Cyndie Lane could change somethings on her MRI so it could be approved. I then made patient aware that Dr. Cyndie Lane requested MRI from her diagnosis and his findings from her appointment. Patient then asked was there anything he could change or say differently to make insurance cover the MRI. I then told patient that he could only go off of his notes and finding, patient again stated that is not what scheduling told her. Patient said can he make his notes look more urgent so that she could get MRI so she can find out what is going on with her.

## 2020-10-26 NOTE — TELEPHONE ENCOUNTER
Spoke with [employer] representative Denise SON, regarding peer to peer. She reports case # [id number] has  on Oct 16, 2020. Case can now be review thru patient's Medical Plan. Attempted to contact [employer] @ appeals [phone]. Left VM to return call to office.

## 2020-12-18 ENCOUNTER — TELEPHONE (OUTPATIENT)
Dept: NEUROLOGY | Age: 30
End: 2020-12-18

## 2020-12-18 NOTE — TELEPHONE ENCOUNTER
Patient has new insurance and would like a new MRI order so that [address] can cover service. Please advise. Insurance has been updated in chart.

## 2021-01-07 ENCOUNTER — TELEPHONE (OUTPATIENT)
Dept: NEUROLOGY | Age: 31
End: 2021-01-07

## 2021-01-07 NOTE — TELEPHONE ENCOUNTER
Patient is scheduled to have Mri done and MRI and CT diagnostics and need the order sent to their office.  Their fax number is [phone]

## 2021-01-22 ENCOUNTER — DOCUMENTATION ONLY (OUTPATIENT)
Dept: NEUROLOGY | Age: 31
End: 2021-01-22

## 2021-01-22 NOTE — PROGRESS NOTES
Office notes faxed to MRI & CT for pt's upcoming MRI on 1/28 by request from [facility] of MRI CT Diagnostics.  Fax number [phone]

## 2021-03-12 ENCOUNTER — OFFICE VISIT (OUTPATIENT)
Dept: NEUROLOGY | Age: 31
End: 2021-03-12
Payer: COMMERCIAL

## 2021-03-12 VITALS
OXYGEN SATURATION: 98 % | SYSTOLIC BLOOD PRESSURE: 160 MMHG | RESPIRATION RATE: 20 BRPM | DIASTOLIC BLOOD PRESSURE: 90 MMHG | HEART RATE: 97 BPM | BODY MASS INDEX: 43.35 KG/M2 | HEIGHT: 61 IN | WEIGHT: 229.6 LBS | TEMPERATURE: 97.3 F

## 2021-03-12 DIAGNOSIS — G95.9 CERVICAL MYELOPATHY (HCC): ICD-10-CM

## 2021-03-12 DIAGNOSIS — G89.29 CHRONIC MIDLINE LOW BACK PAIN WITHOUT SCIATICA: Primary | ICD-10-CM

## 2021-03-12 DIAGNOSIS — M54.50 CHRONIC MIDLINE LOW BACK PAIN WITHOUT SCIATICA: Primary | ICD-10-CM

## 2021-03-12 DIAGNOSIS — M16.11 ARTHRITIS OF RIGHT HIP: ICD-10-CM

## 2021-03-12 PROCEDURE — 99213 OFFICE O/P EST LOW 20 MIN: CPT | Performed by: STUDENT IN AN ORGANIZED HEALTH CARE EDUCATION/TRAINING PROGRAM

## 2021-03-12 NOTE — PROGRESS NOTES
Ibis Horan is a 27 y.o. female . presents for Results (MRI)   . A 27years old female patient here for follow-up of low back pain and pain over the lower extremities. She was seen in the clinic in September 2020 where an MRI of the lumbosacral spine was ordered. She got the MRI on January 28, 2020. The lumbosacral MRI showed L5-S1 bilateral facet arthropathy. In addition it also showed advanced osteoarthritic changes of the right hip joint. There is no spinal canal stenosis or neural foraminal narrowing. She continues to have low back pain which is nonradiating. But she has numbness over the bilateral lower extremities more on the left side. Sometimes the left lower extremity feels weak and might give way. No falls. No bladder or bowel dysfunction. From previous encounter:  A 31years old female patient with past medical history of hypertension, diabetes, Klippel-Feil syndrome, cervical myelopathy (from cervical spinal cord compression) status post ACDF in 2018 here for evaluation of weakness of the lower extremities. In May 2018, patient had an acute onset weakness of the lower extremities and left upper extremity. MRI of the C-spine showed compression of the spinal cord at the C4-5 level; had a spinal fusion surgery (ACDF) in [facility]. Patient at that time had pulmonary edema and was intubated with subsequent tracheostomy. Since that time, she has improved markedly. No weakness of the upper extremities currently. But over the past 5 months, she started to have a clicking sensation over her lower back while exercising or walking for some distances. After exercising on a treadmill for a few minutes, she started to feel numb and weak over the lower extremities bilaterally. Also has some tingling sensation. Weakness is more on the left side. The numbness and weakness gets better after resting for some time. No incontinence.       Results  Pertinent negatives include no chest pain, no headaches and no shortness of breath. Review of Systems   Constitutional: Negative for chills, fever and weight loss (intentional). HENT: Negative for hearing loss and tinnitus. Eyes: Negative for blurred vision and double vision. Respiratory: Negative for cough and shortness of breath. Cardiovascular: Negative for chest pain and leg swelling. Gastrointestinal: Negative for nausea and vomiting. Genitourinary: Negative for dysuria, frequency and urgency. Musculoskeletal: Positive for back pain, joint pain (Ankle; ? hip) and neck pain. Negative for falls. Skin: Negative for itching and rash. Neurological: Positive for dizziness, tingling (sometimes and in arms and legs; position dependent) and focal weakness (left leg gives out  ). Negative for tremors and headaches. Past Medical History:   Diagnosis Date    Dental caries     Diabetes ([facility] [address]     Gastrointestinal disorder     Hernia removed    Hypertension     Ill-defined condition        Past Surgical History:   Procedure Laterality Date    HX GYN      4 sections    HX OTHER SURGICAL      tonsils and addenoids removed    NEUROLOGICAL PROCEDURE UNLISTED      spinal cord surgery 2018        History reviewed. No pertinent family history.      Social History     Socioeconomic History    Marital status:      Spouse name: Not on file    Number of children: Not on file    Years of education: Not on file    Highest education level: Not on file   Occupational History    Not on file   Social Needs    Financial resource strain: Not on file    Food insecurity     Worry: Not on file     Inability: Not on file    Transportation needs     Medical: Not on file     Non-medical: Not on file   Tobacco Use    Smoking status: Former Smoker     Packs/day: 0.50     Quit date: 2018     Years since quittin.8    Smokeless tobacco: Never Used   Substance and Sexual Activity    Alcohol use: No    Drug use: No    Sexual activity: Not on file   Lifestyle    Physical activity     Days per week: Not on file     Minutes per session: Not on file    Stress: Not on file   Relationships    Social connections     Talks on phone: Not on file     Gets together: Not on file     Attends Taoism service: Not on file     Active member of club or organization: Not on file     Attends meetings of clubs or organizations: Not on file     Relationship status: Not on file    Intimate partner violence     Fear of current or ex partner: Not on file     Emotionally abused: Not on file     Physically abused: Not on file     Forced sexual activity: Not on file   Other Topics Concern    Not on file   Social History Narrative    Not on file        Allergies   Allergen Reactions    Levaquin [Levofloxacin] Rash    Penicillins Hives and Rash    Penicillin Hives         Current Outpatient Medications   Medication Sig Dispense Refill    buPROPion XL (Wellbutrin XL) 150 mg tablet Take 150 mg by mouth every morning. Not sure of dose 150mg or 300mg?  clindamycin (CLEOCIN) 300 mg capsule Take 300 mg by mouth three (3) times daily.  metFORMIN (GLUCOPHAGE) 500 mg tablet Take  by mouth two (2) times daily (with meals).  spironolactone (ALDACTONE) 25 mg tablet Take 1 Tab by mouth daily. Indications: hypertension 30 Tab 0    carvedilol (COREG) 25 mg tablet Take 1 Tab by mouth two (2) times daily (with meals). Indications: hypertension 60 Tab 0    indapamide (LOZOL) 2.5 mg tablet Take 1 Tab by mouth daily. Indications: hypertension 30 Tab 0         Physical Exam  Constitutional:       Appearance: Normal appearance. HENT:      Head: Normocephalic and atraumatic. Mouth/Throat:      Mouth: Mucous membranes are moist.      Pharynx: Oropharynx is clear. No oropharyngeal exudate. Eyes:      Extraocular Movements: Extraocular movements intact. Neck:      Musculoskeletal: Neck rigidity (limited range of neck movement) present.    Pulmonary: Effort: Pulmonary effort is normal. No respiratory distress. Musculoskeletal:      Right lower leg: No edema. Left lower leg: No edema. Neurological:      Mental Status: She is alert. Comments: Mental status: Awake, alert, oriented x3, follows simple and complex commands. Speech and languge: fluent, coherent, and comprehension intact  CN: VFF, EOMI, PERRLA, face sensation intact , no facial asymmetry noted, palate elevation symmetric bilat, SS+SCM 5/5 bilat, tongue midline  Motor: no pronator drift, tone slightly increased over the lower extremities(more at the ankles); strengthover the upper extremities is 5/5; LEs 5/5 except for ankle dorsi flexion:L 4+/5. Sensory: intact to light touch and pinprick throughout except slightly decreased over the LLE. Coordination: FNF, HS accurate w/o dysmetria. DTR: 3+ all over;   Gait: Normal.            No visits with results within 3 Month(s) from this visit. Latest known visit with results is:   Admission on 03/23/2020, Discharged on 03/24/2020   Component Date Value Ref Range Status    WBC 03/23/2020 6.8  4.6 - 13.2 K/uL Final    RBC 03/23/2020 4.67  4.20 - 5.30 M/uL Final    HGB 03/23/2020 13.1  12.0 - 16.0 g/dL Final    HCT 03/23/2020 39.0  35.0 - 45.0 % Final    MCV 03/23/2020 83.5  74.0 - 97.0 FL Final    MCH 03/23/2020 28.1  24.0 - 34.0 PG Final    MCHC 03/23/2020 33.6  31.0 - 37.0 g/dL Final    RDW 03/23/2020 12.4  11.6 - 14.5 % Final    PLATELET 51/50/1910 187  135 - 420 K/uL Final    MPV 03/23/2020 10.3  9.2 - 11.8 FL Final    NEUTROPHILS 03/23/2020 63  40 - 73 % Final    LYMPHOCYTES 03/23/2020 29  21 - 52 % Final    MONOCYTES 03/23/2020 6  3 - 10 % Final    EOSINOPHILS 03/23/2020 2  0 - 5 % Final    BASOPHILS 03/23/2020 0  0 - 2 % Final    ABS. NEUTROPHILS 03/23/2020 4.3  1.8 - 8.0 K/UL Final    ABS. LYMPHOCYTES 03/23/2020 2.0  0.9 - 3.6 K/UL Final    ABS. MONOCYTES 03/23/2020 0.4  0.05 - 1.2 K/UL Final    ABS.  EOSINOPHILS 03/23/2020 0.1  0.0 - 0.4 K/UL Final    ABS. BASOPHILS 03/23/2020 0.0  0.0 - 0.1 K/UL Final    DF 03/23/2020 AUTOMATED    Final    Sodium 03/23/2020 139  136 - 145 mmol/L Final    Potassium 03/23/2020 3.5  3.5 - 5.5 mmol/L Final    Chloride 03/23/2020 104  100 - 111 mmol/L Final    CO2 03/23/2020 26  21 - 32 mmol/L Final    Anion gap 03/23/2020 9  3.0 - 18 mmol/L Final    Glucose 03/23/2020 252* 74 - 99 mg/dL Final    BUN 03/23/2020 13  7.0 - 18 MG/DL Final    Creatinine 03/23/2020 0.80  0.6 - 1.3 MG/DL Final    BUN/Creatinine ratio 03/23/2020 16  12 - 20   Final    GFR est AA 03/23/2020 >60  >60 ml/min/1.73m2 Final    GFR est non-AA 03/23/2020 >60  >60 ml/min/1.73m2 Final    Comment: (NOTE)  Estimated GFR is calculated using the Modification of Diet in Renal   Disease (MDRD) Study equation, reported for both  Americans   (GFRAA) and non- Americans (GFRNA), and normalized to 1.73m2   body surface area. The physician must decide which value applies to   the patient. The MDRD study equation should only be used in   individuals age 25 or older. It has not been validated for the   following: pregnant women, patients with serious comorbid conditions,   or on certain medications, or persons with extremes of body size,   muscle mass, or nutritional status.  Calcium 03/23/2020 9.6  8.5 - 10.1 MG/DL Final    Bilirubin, total 03/23/2020 0.3  0.2 - 1.0 MG/DL Final    ALT (SGPT) 03/23/2020 63* 13 - 56 U/L Final    AST (SGOT) 03/23/2020 31  10 - 38 U/L Final    Alk.  phosphatase 03/23/2020 58  45 - 117 U/L Final    Protein, total 03/23/2020 7.1  6.4 - 8.2 g/dL Final    Albumin 03/23/2020 3.5  3.4 - 5.0 g/dL Final    Globulin 03/23/2020 3.6  2.0 - 4.0 g/dL Final    A-G Ratio 03/23/2020 1.0  0.8 - 1.7   Final    Color 03/23/2020 YELLOW    Final    Appearance 03/23/2020 CLEAR    Final    Specific gravity 03/23/2020 >1.030* 1.005 - 1.030 Final    pH (UA) 03/23/2020 5.0  5.0 - 8.0   Final  Protein 03/23/2020 NEGATIVE   NEG mg/dL Final    Glucose 03/23/2020 100* NEG mg/dL Final    Ketone 03/23/2020 NEGATIVE   NEG mg/dL Final    Bilirubin 03/23/2020 NEGATIVE   NEG   Final    Blood 03/23/2020 NEGATIVE   NEG   Final    Urobilinogen 03/23/2020 0.2  0.2 - 1.0 EU/dL Final    Nitrites 03/23/2020 POSITIVE* NEG   Final    Leukocyte Esterase 03/23/2020 TRACE* NEG   Final    HCG, Ql. 03/23/2020 NEGATIVE   NEG   Final    Test results should be confirmed using serum quantitative hCG when detection of pregnancy is critical and before performing any critical medical procedure.  Magnesium 03/23/2020 1.4* 1.6 - 2.6 mg/dL Final    WBC 03/23/2020 11 to 20  0 - 4 /hpf Final    RBC 03/23/2020 4 to 10  0 - 5 /hpf Final    Epithelial cells 03/23/2020 3+  0 - 5 /lpf Final    Bacteria 03/23/2020 4+* NEG /hpf Final    Hyaline cast 03/23/2020 0 to 3  0 - 2 /lpf Final    WBC cast 03/23/2020 0 to 3  NEG /lpf Final             ICD-10-CM ICD-9-CM    1. Chronic midline low back pain without sciatica  M54.5 724.2     G89.29 338.29    2. Cervical myelopathy (HCC)  G95.9 721.1    3. Arthritis of right hip  M16.11 716.95      A 27years old female patient  here for follow-up of lower back pain and numbness of her lower extremities. MRI of the lumbosacral spine showed some facet arthropathic changes at L5 and S1 with no significant spinal canal or neural foraminal narrowing. In addition also has right side advanced at arthropathic changes over the hip joint. Patient might need further evaluation for the right hip. She will follow up with her primary care provider. Her previous cervical myelopathy and had ACDF. Will follow as needed.

## 2021-03-16 ENCOUNTER — TELEPHONE (OUTPATIENT)
Dept: NEUROLOGY | Age: 31
End: 2021-03-16

## 2021-06-09 NOTE — DISCHARGE INSTRUCTIONS
Neuropathic Pain: Care Instructions  Your Care Instructions    Neuropathic pain is caused by pressure on or damage to your nerves. It's often simply called nerve pain. Some people feel this type of pain all the time. For others, it comes and goes. Diabetes, shingles, or an injury can cause nerve pain. Many people say the pain feels sharp, burning, or stabbing. But some people feel it as a dull ache. In some cases, it makes your skin very sensitive. So touch, pressure, and other sensations that did not hurt before may now cause pain. It's important to know that this kind of pain is real and can affect your quality of life. It's also important to know that treatment can help. Treatment includes pain medicines, exercise, and physical therapy. Medicines can help reduce the number of pain signals that travel over the nerves. This can make the painful areas less sensitive. It can also help you sleep better and improve your mood. But medicines are only one part of successful treatment. Most people do best with more than one kind of treatment. Your doctor may recommend that you try cognitive-behavioral therapy and stress management. Or, if needed, you may decide to try to quit smoking, lower your blood pressure, or better control blood sugar. These kinds of healthy changes can also make a difference. If you feel that your treatment is not working, talk to your doctor. And be sure to tell your doctor if you think you might be depressed or anxious. These are common problems that can also be treated. Follow-up care is a key part of your treatment and safety. Be sure to make and go to all appointments, and call your doctor if you are having problems. It's also a good idea to know your test results and keep a list of the medicines you take. How can you care for yourself at home? · Be safe with medicines. Read and follow all instructions on the label.   ¨ If the doctor gave you a prescription medicine for pain, take it as prescribed. ¨ If you are not taking a prescription pain medicine, ask your doctor if you can take an over-the-counter medicine. · Save hard tasks for days when you have less pain. Follow a hard task with an easy task. And remember to take breaks. · Relax, and reduce stress. You may want to try deep breathing or meditation. These can help. · Keep moving. Gentle, daily exercise can help reduce pain. Your doctor or physical therapist can tell you what type of exercise is best for you. This may include walking, swimming, and stationary biking. It may also include stretches and range-of-motion exercises. · Try heat, cold packs, and massage. · Get enough sleep. Constant pain can make you more tired. If the pain makes it hard to sleep, talk with your doctor. · Think positively. Your thoughts can affect your pain. Do fun things to distract yourself from the pain. See a movie, read a book, listen to music, or spend time with a friend. · Keep a pain diary. Try to write down how strong your pain is and what it feels like. Also try to notice and write down how your moods, thoughts, sleep, activities, and medicine affect your pain. These notes can help you and your doctor find the best ways to treat your pain. Reducing constipation caused by pain medicine  Pain medicines often cause constipation. To reduce constipation:  · Include fruits, vegetables, beans, and whole grains in your diet each day. These foods are high in fiber. · Drink plenty of fluids, enough so that your urine is light yellow or clear like water. If you have kidney, heart, or liver disease and have to limit fluids, talk with your doctor before you increase the amount of fluids you drink. · Get some exercise every day. Build up slowly to 30 to 60 minutes a day on 5 or more days of the week. · Take a fiber supplement, such as Citrucel or Metamucil, every day if needed. Read and follow all instructions on the label.   · Schedule time each day for a bowel movement. Having a daily routine may help. Take your time and do not strain when having a bowel movement. · Ask your doctor about a laxative. The goal is to have one easy bowel movement every 1 to 2 days. Do not let constipation go untreated for more than 3 days. When should you call for help? Call your doctor now or seek immediate medical care if:  ? · You feel sad, anxious, or hopeless for more than a few days. This could mean you are depressed. Depression is common in people who have a lot of pain. But it can be treated. ? · You have trouble with bowel movements, such as:  ¨ No bowel movement in 3 days. ¨ Blood in the anal area, in your stool, or on the toilet paper. ¨ Diarrhea for more than 24 hours. ? Watch closely for changes in your health, and be sure to contact your doctor if:  ? · Your pain is getting worse. ? · You can't sleep because of pain. ? · You are very worried or anxious about your pain. ? · You have trouble taking your pain medicine. ? · You have any concerns about your pain medicine or its side effects. ? · You have vomiting or cramps for more than 2 hours. Where can you learn more? Go to http://ivan-tenzin.info/. Enter T932 in the search box to learn more about \"Neuropathic Pain: Care Instructions. \"  Current as of: October 14, 2016  Content Version: 11.4  © 6067-9040 [employer]. Care instructions adapted under license by [employer] (which disclaims liability or warranty for this information). If you have questions about a medical condition or this instruction, always ask your healthcare professional. [address] any warranty or liability for your use of this information. (0) independent No

## 2021-07-21 ENCOUNTER — HOSPITAL ENCOUNTER (EMERGENCY)
Age: 31
Discharge: HOME | End: 2021-07-21
Attending: EMERGENCY MEDICINE
Payer: COMMERCIAL

## 2021-07-21 ENCOUNTER — APPOINTMENT (OUTPATIENT)
Dept: GENERAL RADIOLOGY | Age: 31
End: 2021-07-21
Attending: EMERGENCY MEDICINE
Payer: COMMERCIAL

## 2021-07-21 VITALS
SYSTOLIC BLOOD PRESSURE: 182 MMHG | TEMPERATURE: 98.2 F | HEART RATE: 75 BPM | OXYGEN SATURATION: 98 % | DIASTOLIC BLOOD PRESSURE: 107 MMHG | BODY MASS INDEX: 41.19 KG/M2 | RESPIRATION RATE: 16 BRPM | WEIGHT: 218 LBS

## 2021-07-21 DIAGNOSIS — S93.491A SPRAIN OF ANTERIOR TALOFIBULAR LIGAMENT OF RIGHT ANKLE, INITIAL ENCOUNTER: Primary | ICD-10-CM

## 2021-07-21 PROCEDURE — 99284 EMERGENCY DEPT VISIT MOD MDM: CPT

## 2021-07-21 PROCEDURE — 73610 X-RAY EXAM OF ANKLE: CPT

## 2021-07-21 NOTE — ED PROVIDER NOTES
53-year-old female sustained inversion injury to right ankle 600 hours this morning. Painful to bear weight. No other injuries. Past Medical History:   Diagnosis Date    Dental caries     Diabetes ([address]     Flash pulmonary edema (HCC)     Gastrointestinal disorder     Hernia removed    Hypertension     Ill-defined condition        Past Surgical History:   Procedure Laterality Date    HX GYN      4 sections    HX OTHER SURGICAL      tonsils and addenoids removed    NEUROLOGICAL PROCEDURE UNLISTED      spinal cord surgery 5/2018         History reviewed. No pertinent family history. Social History     Socioeconomic History    Marital status:      Spouse name: Not on file    Number of children: Not on file    Years of education: Not on file    Highest education level: Not on file   Occupational History    Not on file   Tobacco Use    Smoking status: Former Smoker     Packs/day: 0.50     Quit date: 5/8/2018     Years since quitting: 3.2    Smokeless tobacco: Never Used   Substance and Sexual Activity    Alcohol use: Yes     Comment: occasional    Drug use: No    Sexual activity: Not on file   Other Topics Concern    Not on file   Social History Narrative    Not on file     Social Determinants of Health     Financial Resource Strain:     Difficulty of Paying Living Expenses:    Food Insecurity:     Worried About [facility] in the Last Year:     [address] in the Last Year:    Transportation Needs:     Lack of Transportation (Medical):      Lack of Transportation (Non-Medical):    Physical Activity:     Days of Exercise per Week:     Minutes of Exercise per Session:    Stress:     Feeling of Stress :    Social Connections:     Frequency of Communication with Friends and Family:     Frequency of Social Gatherings with Friends and Family:     Attends Protestant Services:     Active Member of Clubs or Organizations:     Attends Club or Organization Meetings:  Marital Status:    Intimate Partner Violence:     Fear of Current or Ex-Partner:     Emotionally Abused:     Physically Abused:     Sexually Abused: ALLERGIES: Levaquin [levofloxacin], Penicillins, and Penicillin    Review of Systems   Cardiovascular: Negative for chest pain. Gastrointestinal: Negative for abdominal pain. Musculoskeletal: Positive for gait problem. Negative for back pain (see HPI) and neck pain. Skin: Negative for rash. All other systems reviewed and are negative. Vitals:    07/21/21 0655   BP: (!) 182/107   Pulse: 75   Resp: 16   Temp: 98.2 °F (36.8 °C)   SpO2: 98%   Weight: 98.9 kg (218 lb)            Physical Exam  Vitals and nursing note reviewed. Constitutional:       General: She is not in acute distress. Appearance: She is well-developed. HENT:      Head: Normocephalic and atraumatic. Eyes:      General: No scleral icterus. Cardiovascular:      Rate and Rhythm: Normal rate. Pulmonary:      Effort: Pulmonary effort is normal.   Musculoskeletal:      Comments: Tenderness lateral aspect right ankle along the lateral malleolus and inferior and anterior to the malleolus. Minimal swelling. Distal neurovascular intact. No proximal tenderness. Skin:     General: Skin is warm and dry. Neurological:      Mental Status: She is alert and oriented to person, place, and time. XR ANKLE RT MIN 3 V   Final Result      No acute bony abnormality. Thank you for your referral.           MDM  Number of Diagnoses or Management Options  Sprain of anterior talofibular ligament of right ankle, initial encounter  Diagnosis management comments: Impression: Right anterior talofibular ligament sprain. Procedures    Diagnosis:   1.  Sprain of anterior talofibular ligament of right ankle, initial encounter          Disposition: home    Follow-up Information     Follow up With Specialties Details Why Contact Info    Elner Lefort, MD Internal Medicine In 2 weeks As needed, for recheck of ongoing symptoms [facility]            Patient's Medications   Start Taking    No medications on file   Continue Taking    BUPROPION XL (WELLBUTRIN XL) 150 MG TABLET    Take 150 mg by mouth every morning. Not sure of dose 150mg or 300mg? CARVEDILOL (COREG) 25 MG TABLET    Take 1 Tab by mouth two (2) times daily (with meals). Indications: hypertension    INDAPAMIDE (LOZOL) 2.5 MG TABLET    Take 1 Tab by mouth daily. Indications: hypertension   These Medications have changed    No medications on file   Stop Taking    CLINDAMYCIN (CLEOCIN) 300 MG CAPSULE    Take 300 mg by mouth three (3) times daily. METFORMIN (GLUCOPHAGE) 500 MG TABLET    Take  by mouth two (2) times daily (with meals). SPIRONOLACTONE (ALDACTONE) 25 MG TABLET    Take 1 Tab by mouth daily.  Indications: hypertension

## 2021-07-21 NOTE — LETTER
[address] [facility] EMERGENCY DEPT  [address] [id number] [phone]    Work/School Note    Date: 7/21/2021    To Whom It May concern:    Lexie Seymour was seen and treated today in the emergency room by the following provider(s):  Attending Provider: Richard Cintron MD.      Lexie Seymour will work light duty, limited use of affected extremity for 3-7 days.     Sincerely,          Lauren Reyes RN

## 2021-07-21 NOTE — ED NOTES
Report given to Link Fallon RN. Pt remains awake/alert; informed of plan of care. No distress noted.

## 2021-07-21 NOTE — DISCHARGE INSTRUCTIONS
1) R.I.C.E. (rest, ice, compression, elevation)  2) Light duty, limited use of affected extremity next 3-7 days  3) Ibuprofen for inflammation and pain  4) Stretching with return to normal activities as tolerated  5) Follow up with your PCP for recheck in 10-14 days if not improved  6) Preliminary reading of ankle x-ray negative.

## 2021-07-21 NOTE — ED NOTES
Digna Chambers is a 27 y.o. female that was discharged in stable condition. The patients diagnosis, condition and treatment were explained to  patient and aftercare instructions were given. The patient verbalized understanding. Patient armband removed and shredded.

## 2021-08-16 NOTE — ED TRIAGE NOTES
Patient to ED with c/o right facial swelling and dental pain since this morning. Patient ambulatory on arrival. Rates pain 8/10. PCN allergy. 4

## 2022-03-19 PROBLEM — M62.08 DIASTASIS RECTI: Status: ACTIVE | Noted: 2018-04-13

## 2022-03-19 PROBLEM — I15.9 SECONDARY HYPERTENSION: Status: ACTIVE | Noted: 2017-08-20

## 2022-03-19 PROBLEM — K02.9 PAIN DUE TO DENTAL CARIES: Status: ACTIVE | Noted: 2017-08-20

## 2023-07-06 ENCOUNTER — HOSPITAL ENCOUNTER (OUTPATIENT)
Facility: HOSPITAL | Age: 33
Discharge: HOME | End: 2023-07-06
Payer: COMMERCIAL

## 2023-07-06 DIAGNOSIS — M53.3 DISORDER OF SACRUM: ICD-10-CM

## 2023-07-06 PROCEDURE — 72220 X-RAY EXAM SACRUM TAILBONE: CPT

## 2024-05-06 ENCOUNTER — OFFICE VISIT (OUTPATIENT)
Age: 34
End: 2024-05-06
Payer: COMMERCIAL

## 2024-05-06 VITALS
DIASTOLIC BLOOD PRESSURE: 100 MMHG | SYSTOLIC BLOOD PRESSURE: 140 MMHG | BODY MASS INDEX: 39.11 KG/M2 | HEART RATE: 105 BPM | WEIGHT: 207 LBS | OXYGEN SATURATION: 100 %

## 2024-05-06 DIAGNOSIS — R94.31 ABNORMAL ELECTROCARDIOGRAPHY: Primary | ICD-10-CM

## 2024-05-06 DIAGNOSIS — I10 PRIMARY HYPERTENSION: ICD-10-CM

## 2024-05-06 PROCEDURE — 3077F SYST BP >= 140 MM HG: CPT | Performed by: INTERNAL MEDICINE

## 2024-05-06 PROCEDURE — 4004F PT TOBACCO SCREEN RCVD TLK: CPT | Performed by: INTERNAL MEDICINE

## 2024-05-06 PROCEDURE — G8419 CALC BMI OUT NRM PARAM NOF/U: HCPCS | Performed by: INTERNAL MEDICINE

## 2024-05-06 PROCEDURE — 3080F DIAST BP >= 90 MM HG: CPT | Performed by: INTERNAL MEDICINE

## 2024-05-06 PROCEDURE — 99204 OFFICE O/P NEW MOD 45 MIN: CPT | Performed by: INTERNAL MEDICINE

## 2024-05-06 PROCEDURE — G8428 CUR MEDS NOT DOCUMENT: HCPCS | Performed by: INTERNAL MEDICINE

## 2024-05-06 RX ORDER — TIRZEPATIDE 10 MG/.5ML
INJECTION, SOLUTION SUBCUTANEOUS
COMMUNITY
Start: 2024-04-10

## 2024-05-06 RX ORDER — ESCITALOPRAM OXALATE 5 MG
1 TABLET ORAL
COMMUNITY

## 2024-05-06 RX ORDER — ROSUVASTATIN CALCIUM 5 MG/1
5 TABLET, COATED ORAL DAILY
COMMUNITY

## 2024-05-06 RX ORDER — ARIPIPRAZOLE 2 MG/1
1 TABLET ORAL
COMMUNITY

## 2024-05-06 RX ORDER — CARVEDILOL 3.12 MG/1
3.12 TABLET ORAL 2 TIMES DAILY
Qty: 60 TABLET | Refills: 5 | Status: SHIPPED | OUTPATIENT
Start: 2024-05-06

## 2024-05-06 RX ORDER — TRAZODONE HYDROCHLORIDE 100 MG/1
TABLET ORAL
COMMUNITY

## 2024-05-06 NOTE — PROGRESS NOTES
[facility]    Leonor Mary is 34 y.o. female     Patient is here today for cardiac evaluation  Denies prior history of MI or CHF  Patient was sent to me for evaluation of hypertension  Patient has history of hypertension for which she was taking losartan, hydrochlorothiazide and carvedilol as well as indapamide however since patient started taking Mounjaro, medication has been discontinued as she was running low blood pressure.  Blood pressure lately has been started to run higher again.  She denies any resting or exertional chest pain or chest tightness.  No orthopnea or PND.  She is taking her medication as prescribed.  No other cardiac complaint.  No significant palpitation, presyncope syncope  Denies any nausea, vomiting, abdominal pain, fever, chills, sputum production. No hematuria or other bleeding complaints    Past Medical History:   Diagnosis Date    Dental caries     Diabetes (HCC)     Flash pulmonary edema (HCC)     Gastrointestinal disorder     Hernia removed    Hypertension     KFS (Klippel-feil syndrome)        Review of Systems:  Cardiac symptoms as noted above in HPI. All others negative.  Denies fatigue, malaise, skin rash, joint pain, blurring vision, photophobia, neck pain, hemoptysis, chronic cough, nausea, vomiting, hematuria, burning micturition, BRBPR, chronic headaches.    Current Outpatient Medications   Medication Sig    MOUNJARO 10 MG/0.5ML SOPN SC injection 0.5 ml Subcutaneous once a wk for 84 days    traZODone (DESYREL) 100 MG tablet TAKE 1 TABLET BY MOUTH ONCE DAILY AT BEDTIME Oral for 90 Days    LEXAPRO 5 MG tablet Take 1 tablet by mouth Every Day    ABILIFY 2 MG tablet Take 1 tablet by mouth Every Day    rosuvastatin (CRESTOR) 5 MG tablet Take 1 tablet by mouth daily    buPROPion (WELLBUTRIN XL) 150 MG extended release tablet Take 1 tablet by mouth     No current facility-administered medications for this visit.